# Patient Record
Sex: FEMALE | Race: BLACK OR AFRICAN AMERICAN | NOT HISPANIC OR LATINO | Employment: FULL TIME | URBAN - METROPOLITAN AREA
[De-identification: names, ages, dates, MRNs, and addresses within clinical notes are randomized per-mention and may not be internally consistent; named-entity substitution may affect disease eponyms.]

---

## 2017-10-24 ENCOUNTER — ALLSCRIPTS OFFICE VISIT (OUTPATIENT)
Dept: OTHER | Facility: OTHER | Age: 47
End: 2017-10-24

## 2017-10-24 DIAGNOSIS — R06.02 SHORTNESS OF BREATH: ICD-10-CM

## 2017-10-24 DIAGNOSIS — R00.2 PALPITATIONS: ICD-10-CM

## 2017-10-25 NOTE — PROGRESS NOTES
Assessment  1  Palpitations (785 1) (R00 2)   2  Shortness of breath (786 05) (R06 02)   3  History of Crohn's disease (V12 70) (Z87 19)   4  Never a smoker   5  Occasional alcohol use    Plan   · ECHO COMPLETE WITH CONTRAST IF INDICATED; Status:Need Information - Financial  Authorization,Retrospective By Protocol Authorization; Requested KZD:[id number];    Perform:[facility]; YIV:[id number]; Last Updated By:Les Taylor; 10/24/2017 4:28:12 PM;Ordered; For:Palpitations, Shortness of breath; Ordered By:Gladis Morgan;   · ECHO STRESS TEST W CONTRAST IF INDICATED; Status:Need Information - Financial  Authorization,Retrospective Authorization; Requested YONY:[id number];    Perform:[facility]iology; BAB:[id number]; Last Updated By:Les Taylor; 10/24/2017 4:28:12 PM;Ordered; For:Palpitations, Shortness of breath; Ordered By:Gladis Morgan;   · HOLTER MONITOR - 48 HOUR; Status:Hold For - Scheduling,Retrospective  Authorization; Requested EFP:[id number];    Perform:[facility]; YWK:[id number]; Last Updated By:Les Taylor; 10/24/2017 4:28:12 PM;Ordered; For:Palpitations, Shortness of breath; Ordered By:Gladis Morgan;   · EKG/ECG- POC; Status:Complete;   Done: [id number]   Perform: In Office; 21 ; Ordered; For:Palpitations, Shortness of breath; Ordered By:Gladis Morgan;    Discussion/Summary    EKG done in the clinic reviewed with the patient  hour Holter test ordered for palpitations  echocardiogram ordered for EF evaluation and for valves and RWMA  test ordered to rule out ischemia as a cause of her dyspnea on exertion  aggressive risk factor modification and therapeutic lifestyle changes  Low salt, low calorie, low fat, low cholesterol diet with regular exercise and to optimize weight  I will defer the ordering and monitoring of necessary lab studies to you, but I am available and happy to review and manage any of that data at your request in the future  concepts of atherosclerosis, including signs and symptoms of cardiac disease  studies were reviewed  measures were reviewed  were entertained and answered  was advised to report any problem requiring medical attention  up with appropriate specialists and lab work as discussed  for follow up visit as scheduled or earlier, if needed  recommendations will be forthcoming after the above testing is performed and results available  you for allowing me to participate in the care and evaluation of your patient  Should you have any questions, please feel free to contact me  The patient was counseled regarding diagnostic results,-- instructions for management,-- risk factor reductions,-- patient and family education,-- importance of compliance with treatment  The patient has the current Goals: Symptom free  The patent has the current Barriers: None  Patient is able to Self-Care  Possible side effects of new medications were reviewed with the patient/guardian today  The treatment plan was reviewed with the patient/guardian  The patient/guardian understands and agrees with the treatment plan      Chief Complaint  Palpitations  Shortness of breath on exertion      History of Present Illness  HPI: Patient states that for the last few days she has been having episodes of fluttering in her chest  Occurs at random times but lasts for a short period  No associated symptoms  Not necessarily related to exertion  She is concerned that it is occurring every day  days ago she was walking briskly to catch a plane and got extremely short of breath with pounding heartbeats  She had to stop multiple times in the brisk walk and is concerned about her dyspnea on exertion  Had some chest heaviness but no chest pain  Patient denies lightheadedness, syncope, swelling feet, orthopnea, PND    states she has put on around 5 pound of weight in the last couple of months      Review of Systems      Cardiac: as noted in HPI  Skin: No complaints of nonhealing sores or skin rash  Genitourinary: No complaints of recurrent urinary tract infections, frequent urination at night, difficult urination, blood in urine, kidney stones, loss of bladder control, kidney problems, denies any birth control or hormone replacement, is not post menopausal, not currently pregnant  Psychological: No complaints of feeling depressed, anxiety, panic attacks, or difficulty concentrating  General: No complaints of trouble sleeping, lack of energy, fatigue, appetite changes, weight changes, fever, frequent infections, or night sweats  Respiratory: as noted in HPI  HEENT: No complaints of serious problems, hearing problems, nose problems, throat problems, or snoring  Gastrointestinal: No complaints of liver problems, nausea, vomiting, heartburn, constipation, bloody stools, diarrhea, problems swallowing, adbominal pain, or rectal bleeding  Hematologic: No complaints of bleeding disorders, anemia, blood clots, or excessive brusing  Neurological: No complaints of numbness, tingling, dizziness, weakness, seizures, headaches, syncope or fainting, AM fatigue, daytime sleepiness, no witnessed apnea episodes  Musculoskeletal: No complaints of arthritis, back pain, or painfull swelling  ROS reviewed  Active Problems  1  Palpitations (785 1) (R00 2)   2  Shortness of breath (786 05) (R06 02)    Past Medical History   · History of Crohn's disease (V12 70) (Z87 19)    The active problems and past medical history were reviewed and updated today  Surgical History   · History of Small Bowel Resection    The surgical history was reviewed and updated today  Family History  Family History    · Family history of hypertension (V17 49) (Z82 49)    The family history was reviewed and updated today  Social History   · Never a smoker   · Occasional alcohol use  The social history was reviewed and updated today  The social history was reviewed and is unchanged  Current Meds   1   No Reported Medications Recorded    The medication list was reviewed and updated today  Allergies  1  No Known Drug Allergies    Vitals  Signs   Heart Rate: 81  Systolic: 483  Diastolic: 76  Weight: 094 lb   O2 Saturation: 97    Physical Exam    Constitutional   General appearance: No acute distress, well appearing and well nourished  Eyes   Conjunctiva and Sclera examination: Conjunctiva pink, sclera anicteric  Ears, Nose, Mouth, and Throat - External inspection of ears and nose: Normal without deformities or discharge  -- Oropharynx: Clear, nares are clear, mucous membranes are moist    Neck   Neck and thyroid: Normal, supple, trachea midline, no thyromegaly  Pulmonary   Respiratory effort: No increased work of breathing or signs of respiratory distress  Auscultation of lungs: Clear to auscultation, no rales, no rhonchi, no wheezing, good air movement  Cardiovascular   Palpation of heart: Normal PMI, no thrills  Auscultation of heart: Normal rate and rhythm, normal S1 and S2, no murmurs  Carotid pulses: Normal, 2+ bilaterally  Pedal pulses: Normal, 2+ bilaterally  Examination of extremities for edema and/or varicosities: Normal     Chest - Chest: Normal    Abdomen   Abdomen: Non-tender and no distention  Liver and spleen: No hepatomegaly or splenomegaly  Musculoskeletal Gait and station: Normal gait  -- Digits and nails: Normal without clubbing or cyanosis  -- Inspection/palpation of joints, bones, and muscles: Normal, ROM normal     Skin - Skin and subcutaneous tissue: Normal without rashes or lesions  Skin is warm and well perfused, normal turgor      Neurologic - Speech: Normal     Psychiatric - Orientation to person, place, and time: Normal -- Mood and affect: Normal       Results/Data  10/24/17 - sinus rhythm, nonspecific T-wave changes      Signatures   Electronically signed by : MILAGRO Otto ; Oct 24 2017  5:37PM EST                       (Author)

## 2017-11-13 ENCOUNTER — HOSPITAL ENCOUNTER (OUTPATIENT)
Dept: NON INVASIVE DIAGNOSTICS | Facility: CLINIC | Age: 47
Discharge: HOME | End: 2017-11-13
Payer: COMMERCIAL

## 2017-11-13 DIAGNOSIS — R00.2 PALPITATIONS: ICD-10-CM

## 2017-11-13 DIAGNOSIS — R06.02 SHORTNESS OF BREATH: ICD-10-CM

## 2017-11-13 PROCEDURE — 93350 STRESS TTE ONLY: CPT

## 2017-11-13 PROCEDURE — 93306 TTE W/DOPPLER COMPLETE: CPT

## 2017-11-13 PROCEDURE — 93225 XTRNL ECG REC<48 HRS REC: CPT

## 2017-11-13 PROCEDURE — 93226 XTRNL ECG REC<48 HR SCAN A/R: CPT

## 2017-11-14 ENCOUNTER — GENERIC CONVERSION - ENCOUNTER (OUTPATIENT)
Dept: OTHER | Facility: OTHER | Age: 47
End: 2017-11-14

## 2017-11-14 LAB
ARRHY DURING EX: NORMAL
CHEST PAIN STATEMENT: NORMAL
MAX DIASTOLIC BP: 70 MMHG
MAX HEART RATE: 157 BPM
MAX PREDICTED HEART RATE: 175 BPM
MAX. SYSTOLIC BP: 162 MMHG
PROTOCOL NAME: NORMAL
REASON FOR TERMINATION: NORMAL
TARGET HR FORMULA: NORMAL
TEST INDICATION: NORMAL
TIME IN EXERCISE PHASE: 416 S

## 2017-11-20 ENCOUNTER — GENERIC CONVERSION - ENCOUNTER (OUTPATIENT)
Dept: OTHER | Facility: OTHER | Age: 47
End: 2017-11-20

## 2018-01-10 NOTE — RESULT NOTES
Verified Results  ECHO STRESS TEST W CONTRAST IF INDICATED [id number] 01:55PM Gabrielle Spurling Order Number: [id number]    - Patient Instructions: To schedule this appointment, please contact Central Scheduling at [phone]  Test Name Result Flag Reference   ECHO STRESS TEST W CONTRAST IF INDICATED (Report)     [address]   [phone]     Exercise Stress Echocardiography     Study date: 2017     Patient: Guzman Romo   MR number: [id number]   Account number: [de-identified]   : 1970   Age: 41 years   Gender: Female   Study date: 2017   Status: Outpatient   Location: [facility]   Height: 68 in   Weight: 179 lb   BP: 122// 80 mmHg     Indications: Detection of coronary artery disease  Diagnosis: R06 02 - Shortness of breath     Sonographer: Bullock RCS   Interpreting Physician: Kassie Judd MD   Referring Physician: Kassie Judd MD   Group: [facility]   Other: Evelia Whyte MS, CCT     IMPRESSIONS:   Normal study after maximal exercise  Left ventricular systolic function was normal      SUMMARY     STRESS RESULTS:   Duration of exercise was 7 min  Maximal heart rate during stress was 157 bpm ( 90 % of maximal predicted heart rate)  Target heart rate was achieved  There was no chest pain during stress  ECG CONCLUSIONS:   The stress ECG was normal    There were no stress arrhythmias or conduction abnormalities  BASELINE:   There were no regional wall motion abnormalities  Estimated left ventricular ejection fraction was in the range of 55 % to 65 %  PEAK STRESS:   There were no regional wall motion abnormalities  There was an appropriate augmentation in LV function  HISTORY: The patient is a 41year old  female  Chest pain status: no chest pain  Other symptoms: dyspnea and palpitations   Coronary artery disease risk factors: none  Cardiovascular history: none significant  Medications:   none  REST ECG: Normal baseline ECG  PROCEDURE: The study was performed in the stress lab  The study was performed in the [address] [place] [address]  Treadmill exercise testing was performed, using the Dariana protocol  Stress and rest echocardiographic evaluation was   performed from multiple acoustic windows for evaluation of ventricular function  DARIANA PROTOCOL:   HR bpm SBP mmHg DBP mmHg Symptoms   Baseline 71 122 80 none   Stage 1 121 138 76 --   Stage 2 148 162 70 --   Stage 3 155 -- -- --   Immediate 157 -- -- --   Recovery 1 131 160 64 --   Recovery 2 105 -- -- --   Recovery 3 97 -- -- --   Recovery 5 94 122 76 --     MEDICATIONS GIVEN: No medications or fluids given  STRESS RESULTS: Duration of exercise was 7 min  The patient exercised to protocol stage 3  Functional capacity was normal  Maximal heart rate during stress was 157 bpm ( 90 % of maximal predicted heart rate)  Target heart rate was   achieved  The heart rate response to stress was normal  Maximal systolic blood pressure during stress was 162 mmHg  There was normal resting blood pressure with an appropriate response to stress  The rate-pressure product for the peak   heart rate and blood pressure was 10024  There was no chest pain during stress  The stress test was terminated due to achievement of target heart rate and mild dyspnea  ECG CONCLUSIONS: The stress ECG was normal  There were no stress arrhythmias or conduction abnormalities  STRESS 2D ECHO RESULTS:     BASELINE: There were no regional wall motion abnormalities  Left ventricular size was normal  Overall left ventricular systolic function was normal  Estimated left ventricular ejection fraction was in the range of 55 % to 65 %  PEAK STRESS: There were no regional wall motion abnormalities  There was an appropriate reduction in left ventricular size   There was an appropriate augmentation in LV function  Prepared and electronically signed by     Solitario Haines MD   Signed [id number] 08:20:20     ECHO COMPLETE WITH CONTRAST IF INDICATED [id number] 01:49PM Naomie Sparks Order Number: [id number]    - Patient Instructions: To schedule this appointment, please contact Central Scheduling at [phone]  Test Name Result Flag Reference   ECHO COMPLETE WITH CONTRAST IF INDICATED (Report)     [facility], [address] [place] [address]   [phone]     Transthoracic Echocardiogram   2D, M-mode, Doppler, and Color Doppler     Study date: 2017     Patient: Kacie Justice   MR number: [id number]   Account number: [de-identified]   : 1970   Age: 41 years   Gender: Female   Status: Outpatient   Location: [facility]   Height: 68 in   Weight: 179 lb   BP: 118/ 76 mmHg     Indications: Palpitations  Diagnoses: R00 2 - Palpitations     Sonographer: Seymour Chapin, RIC   Interpreting Physician: Solitario Haines MD   Referring Physician: Solitario Haines MD   Group: [facility]     LEFT VENTRICLE:   Systolic function was normal  Ejection fraction was estimated to be 60 %  There were no regional wall motion abnormalities  RIGHT VENTRICLE:   The size was normal    Systolic function was normal      MITRAL VALVE:   There was trace regurgitation  TRICUSPID VALVE:   There was mild regurgitation  Pulmonary artery systolic pressure was within the normal range  HISTORY: PRIOR HISTORY: Patient has no history of cardiovascular disease  PROCEDURE: The study was performed in the [address] [place] [address]  This was a routine study  The transthoracic approach was used  The study included complete 2D imaging, M-mode, complete spectral Doppler, and color Doppler  The   heart rate was 84 bpm, at the start of the study   Images were obtained from the parasternal, apical, subcostal, and suprasternal notch acoustic windows  Image quality was adequate  LEFT VENTRICLE: Size was normal  Systolic function was normal  Ejection fraction was estimated to be 60 %  There were no regional wall motion abnormalities  Wall thickness was normal  No evidence of apical thrombus  DOPPLER: Left   ventricular diastolic function parameters were normal      RIGHT VENTRICLE: The size was normal  Systolic function was normal  Wall thickness was normal      LEFT ATRIUM: Size was normal      RIGHT ATRIUM: Size was normal      MITRAL VALVE: Valve structure was normal  There was normal leaflet separation  DOPPLER: The transmitral velocity was within the normal range  There was no evidence for stenosis  There was trace regurgitation  AORTIC VALVE: The valve was trileaflet  Leaflets exhibited normal thickness and normal cuspal separation  DOPPLER: Transaortic velocity was within the normal range  There was no evidence for stenosis  There was no significant   regurgitation  TRICUSPID VALVE: The valve structure was normal  There was normal leaflet separation  DOPPLER: The transtricuspid velocity was within the normal range  There was no evidence for stenosis  There was mild regurgitation  Pulmonary artery   systolic pressure was within the normal range  PULMONIC VALVE: Leaflets exhibited normal thickness, no calcification, and normal cuspal separation  DOPPLER: The transpulmonic velocity was within the normal range  There was no significant regurgitation  PERICARDIUM: There was no pericardial effusion  The pericardium was normal in appearance  AORTA: The root exhibited normal size  SYSTEMIC VEINS: IVC: The inferior vena cava was normal in size  Respirophasic changes were normal      SYSTEM MEASUREMENT TABLES     Apical four chamber   4 chamber Left Atrium Volume Index; Planimetry; End Systole;  Apical four chamber;: 14 89 cm2   Left Ventricular Ejection Fraction; Method of Disks, Single Plane; Apical four chamber;: 68 %   Left Ventricular End Diastolic Volume; Method of Disks, Single Plane; Apical four chamber;: 60 9 ml   Left Ventricular End Systolic Volume; Method of Disks, Single Plane; Apical four chamber;: 19 5 ml   Right Atrium Systolic Area; Planimetry; End Systole; Apical four chamber;: 12 06 cm2   Right Ventricular Internal Diastolic Dimension; End Diastole; Apical four chamber;: 40 mm   TAPSE: 24 3 mm     Unspecified Scan Mode   Aortic Root Diameter; End Systole;: 30 7 mm   Gradient Pressure, Peak; Simplified Bernoulli; Antegrade Flow; Systole;: 8 1 mm[Hg]   Gradient pressure, average; Simplified Bernoulli; Antegrade Flow; Systole;: 5 1 mm[Hg]   Left atrial diameter; End Diastole;: 35 3 mm   Cardiac Output; Teichholz; Systole;: 4 1 L/min   Interventricular Septum Diastolic Thickness; Teichholz; End Diastole;: 10 2 mm   Left Ventricle Internal End Diastolic Dimension; Teichholz;: 42 1 mm   Left Ventricle Internal Systolic Dimension; Teichholz; End Systole;: 26 2 mm   Left Ventricle Posterior Wall Diastolic Thickness; Teichholz; End Diastole;: 9 2 mm   Left Ventricular Ejection Fraction; Teichholz;: 68 2 %   Stroke volume;  Teichholz; Systole;: 53 9 ml   Mitral Valve Area; Area by Pressure Half-Time; Systole;: 4 31 cm2   Mitral Valve E to A Ratio; Systole;: 1 56   Maximum Tricuspid valve regurgitation pressure gradient; Regurgitant Flow; Systole;: 28 2 mm[Hg]     [facility]     Prepared and electronically signed by     Jacey Tavarez MD   Signed [id number] 84:01:79

## 2018-01-13 VITALS
DIASTOLIC BLOOD PRESSURE: 76 MMHG | SYSTOLIC BLOOD PRESSURE: 118 MMHG | HEART RATE: 81 BPM | OXYGEN SATURATION: 97 % | WEIGHT: 179 LBS

## 2018-02-13 NOTE — RESULT NOTES
Verified Results  HOLTER MONITOR - 50 HOUR [id number] 01:55PM Perry Burleson Order Number: [id number]    - Patient Instructions: To schedule this appointment, please contact Central Scheduling at [phone]  Test Name Result Flag Reference   HOLTER MONITOR - 48 HOUR (Report)     INDICATIONS:    Palpitations, SOB     1  This Holter monitoring and analysis was done for a period of 47 hours and 59 minutes  2  The rhythm was sinus  Average heart rate was 82 bpm  Minimum heart rate was 56 bpm  Maximum heart rate was 135 bpm    3  No ventricular ectopic activity was seen  4  Supraventricular ectopic activity consisted of 1 PAC  5  No irregular rhythm episodes were detected  6  Patient's rhythm included 13 minutes 30 seconds of bradycardia  The slowest single episode of bradycardia lasted 17 seconds with a minimum heart rate of 56 bpm The longest R-R interval 1 2 seconds  7  Patient's rhythm included 4 hours 42 minutes of tachycardia  The fastest single episode of tachycardia lasted 8 minutes 8 seconds with a maximum heart rate of 135 bpm    8  No complaints were noted in the patient's diary during the study  CONCLUSIONS:   Sinus rhythm with minimal ectopic activity as detailed above     Absence of symptoms during the study

## 2019-12-01 ENCOUNTER — HOSPITAL ENCOUNTER (EMERGENCY)
Facility: HOSPITAL | Age: 49
Discharge: HOME | End: 2019-12-01
Attending: EMERGENCY MEDICINE | Admitting: EMERGENCY MEDICINE
Payer: COMMERCIAL

## 2019-12-01 ENCOUNTER — APPOINTMENT (EMERGENCY)
Dept: RADIOLOGY | Facility: HOSPITAL | Age: 49
End: 2019-12-01
Payer: COMMERCIAL

## 2019-12-01 VITALS
DIASTOLIC BLOOD PRESSURE: 70 MMHG | HEART RATE: 92 BPM | RESPIRATION RATE: 18 BRPM | WEIGHT: 177.91 LBS | SYSTOLIC BLOOD PRESSURE: 137 MMHG | OXYGEN SATURATION: 98 % | TEMPERATURE: 98.5 F
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
## 2019-12-01 DIAGNOSIS — S52.122A LEFT RADIAL HEAD FRACTURE: ICD-10-CM

## 2019-12-01 PROCEDURE — 73080 X-RAY EXAM OF ELBOW: CPT

## 2019-12-01 PROCEDURE — 99283 EMERGENCY DEPT VISIT LOW MDM: CPT

## 2019-12-01 PROCEDURE — 99284 EMERGENCY DEPT VISIT MOD MDM: CPT | Performed by: EMERGENCY MEDICINE

## 2019-12-01 NOTE — ED PROVIDER NOTES
History  Chief Complaint   Patient presents with    Fall     Pt reports having left hip, elbow, and shoulder pain since she slipped off a box and landed on pavement  Pt denies LOC  Tripped and fell yesterday while stepping on a box to try to collapse the box  Landed on L side and L elbow  Did not strike head or lose consciousness  No HA, neck pain, numbness, weakness, cp, sob, abd pain or back pain  Has mild L hip pain, still ambulating w/o difficulty  Mostly c/o L elbow pain which is moderate, worse with movement, associated w inability to extend arm and painful supination  No numbness or weakness or arm  No hand or forearm pain or swelling  Shoulder is mild painful but ranges it without difficulty or limitation  Not anticoagulated  No fever or chills  R hand dominant  Works as a   No other sxs or concerns at this time  None       History reviewed  No pertinent past medical history  History reviewed  No pertinent surgical history  History reviewed  No pertinent family history  I have reviewed and agree with the history as documented  Social History     Tobacco Use    Smoking status: Not on file   Substance Use Topics    Alcohol use: Not on file    Drug use: Not on file        Review of Systems   Musculoskeletal: Positive for joint swelling  All other systems reviewed and are negative  Physical Exam  Physical Exam   Constitutional: She is oriented to person, place, and time  She appears well-developed and well-nourished  No distress  HENT:   Head: Normocephalic and atraumatic  Eyes: Pupils are equal, round, and reactive to light  Conjunctivae and EOM are normal    Neck: Normal range of motion  Neck supple  No JVD present  Cardiovascular: Normal rate, regular rhythm, normal heart sounds and intact distal pulses  Pulmonary/Chest: Effort normal and breath sounds normal  No stridor  Abdominal: Soft  She exhibits no distension  There is no tenderness   There is no rebound and no guarding  Musculoskeletal: Normal range of motion  She exhibits edema and tenderness  She exhibits no deformity  Swelling and tenderness overlying posterior elbow/distal L arm  Painless passive ROM L shoulder  Forearm soft, nontender  Normal distal perfusion and sensation of LUE  L hand and wrist nontender  Painless passive ROM L hip and knee  Neurological: She is alert and oriented to person, place, and time  No cranial nerve deficit or sensory deficit  She exhibits normal muscle tone  Coordination normal    Skin: Skin is warm and dry  Capillary refill takes less than 2 seconds  No rash noted  She is not diaphoretic  No erythema  No pallor  Nursing note and vitals reviewed  Vital Signs  ED Triage Vitals [12/01/19 1028]   Temperature Pulse Respirations Blood Pressure SpO2   98 5 °F (36 9 °C) 92 18 137/70 98 %      Temp Source Heart Rate Source Patient Position - Orthostatic VS BP Location FiO2 (%)   Oral Monitor Sitting Right arm --      Pain Score       --           Vitals:    12/01/19 1028   BP: 137/70   Pulse: 92   Patient Position - Orthostatic VS: Sitting         Visual Acuity  Visual Acuity      Most Recent Value   L Pupil Size (mm)  3   R Pupil Size (mm)  3          ED Medications  Medications - No data to display    Diagnostic Studies  Results Reviewed     None                 XR elbow 3+ vw LEFT   ED Interpretation by Noah Santos MD (12/01 1121)   Lucency through lateral radial head  Abnormal appearance of distal humerus  Procedures  Procedures       ED Course                               MDM    Disposition  Final diagnoses:   Fall, initial encounter   Left radial head fracture     Time reflects when diagnosis was documented in both MDM as applicable and the Disposition within this note     Time User Action Codes Description Comment    12/1/2019 11:30 AM Sweta Galaviz  BBCA] Fall, initial encounter     12/1/2019 11:30 AM Juan A Richard Blocker Add [G86 480N] Left radial head fracture       ED Disposition     ED Disposition Condition Date/Time Comment    Discharge Stable Sun Dec 1, 2019 11:30 AM Kai Herrmann discharge to home/self care  Follow-up Information     Follow up With Specialties Details Why Shavonne Richey MD Orthopedic Surgery In 1 week  [address] [address] [phone]            There are no discharge medications for this patient  No discharge procedures on file      ED Provider  Electronically Signed by           Slava Camp MD  12/01/19 5850

## 2019-12-04 ENCOUNTER — OFFICE VISIT (OUTPATIENT)
Dept: OBGYN CLINIC | Facility: CLINIC | Age: 49
End: 2019-12-04
Payer: COMMERCIAL

## 2019-12-04 VITALS
DIASTOLIC BLOOD PRESSURE: 74 MMHG | BODY MASS INDEX: 28.59 KG/M2 | HEART RATE: 94 BPM | SYSTOLIC BLOOD PRESSURE: 107 MMHG | HEIGHT: 66 IN | WEIGHT: 177.9 LBS

## 2019-12-04 DIAGNOSIS — M25.522 PAIN IN LEFT ELBOW: Primary | ICD-10-CM

## 2019-12-04 PROCEDURE — 99203 OFFICE O/P NEW LOW 30 MIN: CPT | Performed by: ORTHOPAEDIC SURGERY

## 2019-12-04 NOTE — PROGRESS NOTES
CHIEF COMPLAINT:  Chief Complaint   Patient presents with    Left Elbow - Pain       SUBJECTIVE:  Veena Goodman is a 54y o  year old RHD female who presents left elbow pain  Patient states that she had a fall on 11/30/2019 onto her left hip, elbow and shoulder  She went to the emergency room on 12/01/2019 for evaluation  She had x-rays which demonstrated some fluid in the anterior posterior aspect of the elbow with no discernible fracture  She was given a sling and instructed follow up with Orthopedics  Today she states she has some discomfort with coming out into full extension  She has been moving her elbow back and forth to help prevent stiffness  She denies any numbness or tingling  PAST MEDICAL HISTORY:  History reviewed  No pertinent past medical history  PAST SURGICAL HISTORY:  History reviewed  No pertinent surgical history  FAMILY HISTORY:  History reviewed  No pertinent family history  SOCIAL HISTORY:  Social History     Tobacco Use    Smoking status: Not on file   Substance Use Topics    Alcohol use: Not on file    Drug use: Not on file       MEDICATIONS:  No current outpatient medications on file      ALLERGIES:  No Known Allergies    REVIEW OF SYSTEMS:  Review of Systems  ROS:   General: no fever, no chills  HEENT:  No loss of hearing or eyesight problems  Eyes:  No red eyes  Respiratory:  No coughing, shortness of breath or wheezing  Cardiovascular:  No chest pain, no palpitations  GI:  Abdomen soft nontender, denies nausea  Endocrine:  No muscle weakness, no frequent urination, no excessive thirst  Urinary:  No dysuria, no incontinence  Musculoskeletal: see HPI and PE  SKIN:  No skin rash, no dry skin  Neurological:  No headaches, no confusion  Psychiatric:  No suicide thoughts, no anxiety, no depression  Review of all other systems is negative    VITALS:  Vitals:    12/04/19 1000   BP: 107/74   Pulse: 94       LABS:  HgA1c: No results found for: HGBA1C  BMP: No results found for: GLUCOSE, CALCIUM, NA, K, CO2, CL, BUN, CREATININE    _____________________________________________________  PHYSICAL EXAMINATION:  General: well developed and well nourished, alert, oriented times 3 and appears comfortable  Psychiatric: Normal  HEENT: Trachea Midline, No torticollis  Pulmonary: No audible wheezing or strider  Cardiovascular: No discernable arrhythmia   Skin: No masses, erythema, lacerations, fluctation, ulcerations  Neurovascular: Sensation Intact to the Median, Ulnar, Radial Nerve, Motor Intact to the Median, Ulnar, Radial Nerve and Pulses Intact    MUSCULOSKELETAL EXAMINATION:  Left elbow  No erythema edema or ecchymosis noted, skin is warm to touch  Tenderness to palpation over the UCL  Elbow is stable to stress testing  Range of motion is limited only at terminal ends of flexion and extension  Patient is neurovascularly intact    ___________________________________________________  STUDIES REVIEWED:  Images obtained on 12/01/2019 of the Left elbow 3 views demonstrate No acute fractures or dislocations appreciated, there is anterior and posterior fat pad signs appreciated      PROCEDURES PERFORMED:  Procedures  No Procedures performed today    _____________________________________________________  ASSESSMENT/PLAN:    Left elbow pain with associated fat pad sign on x-ray with UCL strain  - patient has good range of motion upon exam today  - she was instructed that she can work on her range of motion and use the sling as needed for comfort  - she can take Tylenol and anti-inflammatories as needed  - she will follow-up in 3 weeks for clinical re-evaluation    Follow Up:  Return in about 3 weeks (around 12/25/2019)      Work/school status:  No restrictions    To Do Next Visit:  Re-evaluation of current issue        Scribe Attestation    I,:   Greyson Becerril PA-C am acting as a scribe while in the presence of the attending physician :        I,:   Sharan Arevalo MD personally performed the services described in this documentation    as scribed in my presence :

## 2020-03-25 ENCOUNTER — NURSE TRIAGE (OUTPATIENT)
Dept: OTHER | Facility: OTHER | Age: 50
End: 2020-03-25

## 2020-03-25 NOTE — TELEPHONE ENCOUNTER
Regarding: Coronavirus concerns  ----- Message from Miguel Velásquez sent at 3/25/2020  2:17 PM EDT -----  " Shortness of breath, cough, sinus congestion ( flight attendantno pcp)  "

## 2020-03-25 NOTE — TELEPHONE ENCOUNTER
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

## 2020-03-25 NOTE — TELEPHONE ENCOUNTER
Reason for Disposition   [1] Fever (or feeling feverish) OR cough occurs AND [2] travel from or living in high risk area (identified by [facility]) AND [3] within last 14 days    Answer Assessment - Initial Assessment Questions  1  CONFIRMED CASE: "Who is the person with the confirmed COVID-19 infection that you were exposed to?"      n/a  2  PLACE of CONTACT: "Where were you when you were exposed to COVID-19  (coronavirus disease 2019)?" (e g , city, state, country)      [place] and [place]  3  TYPE of CONTACT: "How much contact was there?" (e g , live in same house, work in same office, same school)      Direct contact with a couhig child on a flight  4  DATE of CONTACT: "When did you have contact with a coronavirus patient?" (e g , days)     3/18/20  5  DURATION of CONTACT: "How long were you in contact with the COVID-19 (coronavirus disease) patient?" (e g , a few seconds, passed by person, a few minutes, live with the patient)      Few seconds  6  SYMPTOMS: "Do you have any symptoms?" (e g , fever, cough, breathing difficulty)      Cough,mild SOB,wheeze,chest pressure  7  PREGNANCY OR POSTPARTUM: "Is there any chance you are pregnant?" "When was your last menstrual period?" "Did you deliver in the last 2 weeks?"      n/a  8  HIGH RISK: "Do you have any heart or lung problems?  Do you have a weakened immune system?" (e g , CHF, COPD, asthma, HIV positive, chemotherapy, renal failure, diabetes mellitus, sickle cell anemia)      n/a    Protocols used: CORONAVIRUS (COVID-19) EXPOSURE-ADULT-[place]

## 2020-03-26 ENCOUNTER — NURSE TRIAGE (OUTPATIENT)
Dept: OTHER | Facility: OTHER | Age: 50
End: 2020-03-26

## 2020-03-26 DIAGNOSIS — Z20.828 EXPOSURE TO SARS-ASSOCIATED CORONAVIRUS: ICD-10-CM

## 2020-03-26 DIAGNOSIS — Z20.828 EXPOSURE TO SARS-ASSOCIATED CORONAVIRUS: Primary | ICD-10-CM

## 2020-03-26 PROCEDURE — U0002 COVID-19 LAB TEST NON-CDC: HCPCS

## 2020-03-26 PROCEDURE — 87635 SARS-COV-2 COVID-19 AMP PRB: CPT

## 2020-03-26 NOTE — TELEPHONE ENCOUNTER
Called yesterday 3/25/20 and stated she spoke with triage nurse or ordered a Vernadine Shells test at [facility] testing site but was unable to go before it closed  No call or order seen in chart  Patient is a  out of [address] [place] [address]  Thinks she was exposed due to sick passengers on the plane  C/o cough, mild SOB, no fever  Sent to [facility] testing site in 1 hour  Reason for Disposition   [1] Fever (or feeling feverish) OR cough occurs AND [2] travel from or living in high risk area (identified by [facility]) AND [3] within last 14 days    Answer Assessment - Initial Assessment Questions  1  CONFIRMED CASE: "Who is the person with the confirmed COVID-19 infection that you were exposed to?"      N/A  2  PLACE of CONTACT: "Where were you when you were exposed to COVID-19  (coronavirus disease 2019)?" (e g , city, state, country)       out of [address] [place] [address]  3  TYPE of CONTACT: "How much contact was there?" (e g , live in same house, work in same office, same school)      N/A  4  DATE of CONTACT: "When did you have contact with a coronavirus patient?" (e g , days)      3/19/2020 on flight with sick passengers  5  DURATION of CONTACT: "How long were you in contact with the COVID-19 (coronavirus disease) patient?" (e g , a few seconds, passed by person, a few minutes, live with the patient)      Minimal  6  SYMPTOMS: "Do you have any symptoms?" (e g , fever, cough, breathing difficulty      Cough, no fever, mild SOB  7  PREGNANCY OR POSTPARTUM: "Is there any chance you are pregnant?" "When was your last menstrual period?" "Did you deliver in the last 2 weeks?"      No  8  HIGH RISK: "Do you have any heart or lung problems?  Do you have a weakened immune system?" (e g , CHF, COPD, asthma, HIV positive, chemotherapy, renal failure, diabetes mellitus, sickle cell anemia)      Asthma as child, heart murmur    Protocols used: CORONAVIRUS (COVID-19) EXPOSURE-ADULT-[place]

## 2020-03-26 NOTE — TELEPHONE ENCOUNTER
Regarding: COVID  ----- Message from Frandy Bhatti MA sent at 3/26/2020  7:13 AM EDT -----  Patient called stating she called yesterday and spoke with triage nurse and was directed to go to testing center by 5 PM  Patient states she was unable to make it to center before closing as it was 4 PM and was directed to call back today  Symptoms- cough, SOB, sinus congestion  Traveled due to work as        No PCP

## 2020-03-30 ENCOUNTER — NURSE TRIAGE (OUTPATIENT)
Dept: OTHER | Facility: OTHER | Age: 50
End: 2020-03-30

## 2020-03-30 NOTE — TELEPHONE ENCOUNTER
Regarding: [facility] Test Note for Work  ----- Message from [employer] sent at 3/30/2020 11:03 AM EDT -----  " needs a note for work, to prove that she has taken the covid-19 test has went through call hub and was tested on 3/26 "

## 2020-03-31 LAB — SARS-COV-2 RNA SPEC QL NAA+PROBE: NOT DETECTED

## 2020-04-01 ENCOUNTER — HOSPITAL ENCOUNTER (EMERGENCY)
Facility: HOSPITAL | Age: 50
Discharge: HOME | End: 2020-04-01
Attending: EMERGENCY MEDICINE | Admitting: EMERGENCY MEDICINE
Payer: COMMERCIAL

## 2020-04-01 ENCOUNTER — APPOINTMENT (EMERGENCY)
Dept: RADIOLOGY | Facility: HOSPITAL | Age: 50
End: 2020-04-01
Payer: COMMERCIAL

## 2020-04-01 VITALS
HEART RATE: 78 BPM | RESPIRATION RATE: 18 BRPM | HEIGHT: 66 IN | OXYGEN SATURATION: 100 % | SYSTOLIC BLOOD PRESSURE: 131 MMHG | WEIGHT: 190.92 LBS | TEMPERATURE: 98.2 F | BODY MASS INDEX: 30.68 KG/M2 | DIASTOLIC BLOOD PRESSURE: 74 MMHG

## 2020-04-01 DIAGNOSIS — R05.9 COUGH: ICD-10-CM

## 2020-04-01 DIAGNOSIS — B34.9 VIRAL SYNDROME: Primary | ICD-10-CM

## 2020-04-01 PROCEDURE — 99283 EMERGENCY DEPT VISIT LOW MDM: CPT | Performed by: PHYSICIAN ASSISTANT

## 2020-04-01 PROCEDURE — 71045 X-RAY EXAM CHEST 1 VIEW: CPT

## 2020-04-01 PROCEDURE — 99283 EMERGENCY DEPT VISIT LOW MDM: CPT

## 2020-04-01 RX ORDER — AZITHROMYCIN 250 MG/1
TABLET, FILM COATED ORAL
Qty: 6 TABLET | Refills: 0 | Status: SHIPPED | OUTPATIENT
Start: 2020-04-01 | End: 2020-04-05

## 2020-04-01 RX ORDER — ALBUTEROL SULFATE 2.5 MG/3ML
2.5 SOLUTION RESPIRATORY (INHALATION) EVERY 6 HOURS PRN
Qty: 75 ML | Refills: 0 | Status: SHIPPED | OUTPATIENT
Start: 2020-04-01

## 2020-04-01 RX ORDER — ALBUTEROL SULFATE 90 UG/1
2 AEROSOL, METERED RESPIRATORY (INHALATION) EVERY 4 HOURS PRN
Qty: 1 INHALER | Refills: 0 | Status: SHIPPED | OUTPATIENT
Start: 2020-04-01

## 2020-04-01 RX ORDER — FLUTICASONE PROPIONATE 50 MCG
1 SPRAY, SUSPENSION (ML) NASAL DAILY
Qty: 16 G | Refills: 0 | Status: SHIPPED | OUTPATIENT
Start: 2020-04-01

## 2020-04-27 DIAGNOSIS — B34.9 VIRAL SYNDROME: ICD-10-CM

## 2020-04-28 RX ORDER — FLUTICASONE PROPIONATE 50 MCG
SPRAY, SUSPENSION (ML) NASAL
Qty: 16 ML | Refills: 0 | OUTPATIENT
Start: 2020-04-28

## 2020-06-01 ENCOUNTER — OFFICE VISIT (OUTPATIENT)
Dept: URGENT CARE | Facility: CLINIC | Age: 50
End: 2020-06-01
Payer: COMMERCIAL

## 2020-06-01 VITALS
RESPIRATION RATE: 16 BRPM | HEIGHT: 66 IN | BODY MASS INDEX: 29.09 KG/M2 | DIASTOLIC BLOOD PRESSURE: 86 MMHG | HEART RATE: 78 BPM | WEIGHT: 181 LBS | SYSTOLIC BLOOD PRESSURE: 134 MMHG | TEMPERATURE: 97.9 F | OXYGEN SATURATION: 99 %

## 2020-06-01 DIAGNOSIS — K08.89 PAIN, DENTAL: Primary | ICD-10-CM

## 2020-06-01 PROCEDURE — 99203 OFFICE O/P NEW LOW 30 MIN: CPT | Performed by: PHYSICIAN ASSISTANT

## 2020-06-01 RX ORDER — CLINDAMYCIN HYDROCHLORIDE 300 MG/1
300 CAPSULE ORAL 4 TIMES DAILY
Qty: 28 CAPSULE | Refills: 0 | Status: SHIPPED | OUTPATIENT
Start: 2020-06-01 | End: 2020-06-08

## 2022-04-07 ENCOUNTER — HOSPITAL ENCOUNTER (EMERGENCY)
Facility: HOSPITAL | Age: 52
Discharge: HOME | End: 2022-04-07
Attending: EMERGENCY MEDICINE | Admitting: EMERGENCY MEDICINE
Payer: COMMERCIAL

## 2022-04-07 VITALS
RESPIRATION RATE: 18 BRPM | WEIGHT: 184.2 LBS | DIASTOLIC BLOOD PRESSURE: 65 MMHG | SYSTOLIC BLOOD PRESSURE: 150 MMHG | BODY MASS INDEX: 29.73 KG/M2 | TEMPERATURE: 98.1 F | HEART RATE: 100 BPM | OXYGEN SATURATION: 100 %

## 2022-04-07 DIAGNOSIS — H66.90 OTITIS MEDIA: Primary | ICD-10-CM

## 2022-04-07 PROCEDURE — 99284 EMERGENCY DEPT VISIT MOD MDM: CPT | Performed by: PHYSICIAN ASSISTANT

## 2022-04-07 PROCEDURE — 99284 EMERGENCY DEPT VISIT MOD MDM: CPT

## 2022-04-07 PROCEDURE — 93005 ELECTROCARDIOGRAM TRACING: CPT

## 2022-04-07 RX ORDER — AMOXICILLIN AND CLAVULANATE POTASSIUM 875; 125 MG/1; MG/1
1 TABLET, FILM COATED ORAL 2 TIMES DAILY
Qty: 14 TABLET | Refills: 0 | Status: SHIPPED | OUTPATIENT
Start: 2022-04-07 | End: 2022-04-14

## 2022-04-07 RX ORDER — CIPROFLOXACIN/HYDROCORTISONE 0.2 %-1 %
3 SUSPENSION, DROPS(FINAL DOSAGE FORM)(ML) OTIC (EAR) 2 TIMES DAILY
Qty: 10 ML | Refills: 0 | OUTPATIENT
Start: 2022-04-07 | End: 2022-04-08

## 2022-04-07 RX ORDER — AMOXICILLIN AND CLAVULANATE POTASSIUM 875; 125 MG/1; MG/1
1 TABLET, FILM COATED ORAL ONCE
Status: COMPLETED | OUTPATIENT
Start: 2022-04-07 | End: 2022-04-07

## 2022-04-07 RX ORDER — MECLIZINE HYDROCHLORIDE 25 MG/1
25 TABLET ORAL 3 TIMES DAILY PRN
Qty: 25 TABLET | Refills: 0 | Status: SHIPPED | OUTPATIENT
Start: 2022-04-07

## 2022-04-07 RX ORDER — MECLIZINE HYDROCHLORIDE 25 MG/1
25 TABLET ORAL ONCE
Status: COMPLETED | OUTPATIENT
Start: 2022-04-07 | End: 2022-04-07

## 2022-04-07 RX ADMIN — MECLIZINE HYDROCHLORIDE 25 MG: 25 TABLET ORAL at 19:54

## 2022-04-07 RX ADMIN — AMOXICILLIN AND CLAVULANATE POTASSIUM 1 TABLET: 875; 125 TABLET, FILM COATED ORAL at 19:54

## 2022-04-07 NOTE — ED PROVIDER NOTES
History  Chief Complaint   Patient presents with    Dizziness     pt c/o feeling dizzy and room spinning while taking off on flight this morning  Pt again felt dizzy again on return flight and was vomiting and cold sweats and earache  Pt is a  and this "felt different"  denies cp/sob     55 yo with dizziness and ear ache  Symptoms began this morning  States she woke up and felt sinus pressure and bilateral ear ache  Left worse than right  Around 0830 she had a 10-15 minute episode of dizziness described as room spinning  She closed her eyes and held still and the dizziness improved  She has a "blocked sensation" in her left ear all day today  No fever  No vomiting  No pain or swelling in mastoid area  Happened once more around 1230  No h/o similar  She has h/o crohn's and is otherwise healthy  She does use Q tips on a daily basis as well  She has not had the dizziness since 1230 but has had continued ear ache  History provided by:  Patient   used: No    Dizziness  Quality:  Room spinning  Severity:  Severe  Onset quality:  Sudden  Duration: 15 minutes  Timing:  Sporadic  Progression:  Partially resolved  Chronicity:  New  Relieved by:  Nothing  Worsened by:  Closing eyes and being still  Associated symptoms: nausea    Associated symptoms: no chest pain, no palpitations, no shortness of breath and no vomiting        Prior to Admission Medications   Prescriptions Last Dose Informant Patient Reported? Taking?    albuterol (2 5 mg/3 mL) 0 083 % nebulizer solution   No No   Sig: Take 1 vial (2 5 mg total) by nebulization every 6 (six) hours as needed for wheezing or shortness of breath   albuterol (PROVENTIL HFA,VENTOLIN HFA) 90 mcg/act inhaler   No No   Sig: Inhale 2 puffs every 4 (four) hours as needed for wheezing   fluticasone (FLONASE) 50 mcg/act nasal spray   No No   Si spray into each nostril daily      Facility-Administered Medications: None       Past Medical History:   Diagnosis Date    Crohn disease ([facility] [address] )        Past Surgical History:   Procedure Laterality Date    LAPAROSCOPIC COLON RESECTION         History reviewed  No pertinent family history  I have reviewed and agree with the history as documented  E-Cigarette/Vaping    E-Cigarette Use Never User      E-Cigarette/Vaping Substances     Social History     Tobacco Use    Smoking status: Never Smoker    Smokeless tobacco: Never Used   Vaping Use    Vaping Use: Never used   Substance Use Topics    Alcohol use: Yes    Drug use: Never       Review of Systems   Constitutional: Negative for chills and fever  HENT: Negative for ear pain and sore throat  Eyes: Negative for pain and visual disturbance  Respiratory: Negative for cough and shortness of breath  Cardiovascular: Negative for chest pain and palpitations  Gastrointestinal: Positive for nausea  Negative for abdominal pain and vomiting  Genitourinary: Negative for dysuria and hematuria  Musculoskeletal: Negative for arthralgias and back pain  Skin: Negative for color change and rash  Neurological: Positive for dizziness  Negative for seizures and syncope  All other systems reviewed and are negative  Physical Exam  Physical Exam  Vitals and nursing note reviewed  Constitutional:       General: She is not in acute distress  Appearance: She is well-developed  HENT:      Head: Normocephalic and atraumatic  Right Ear: Hearing, ear canal and external ear normal  Tympanic membrane is injected  Tympanic membrane is not scarred, perforated, erythematous or bulging  Left Ear: Tympanic membrane is injected, erythematous and bulging  Tympanic membrane is not scarred or perforated  Ears:      Comments: Injection of right TM  The left TM is bulging and erythematous  Additionally there appears to be a hematoma along the external auditory canal at the 7 o'clock position  It is not hemotympanum   No mastoid pain/tenderness  Eyes:      Conjunctiva/sclera: Conjunctivae normal    Cardiovascular:      Rate and Rhythm: Normal rate and regular rhythm  Heart sounds: No murmur heard  Pulmonary:      Effort: Pulmonary effort is normal  No respiratory distress  Breath sounds: Normal breath sounds  Abdominal:      Palpations: Abdomen is soft  Tenderness: There is no abdominal tenderness  Musculoskeletal:      Cervical back: Neck supple  Skin:     General: Skin is warm and dry  Neurological:      Mental Status: She is alert  GCS: GCS eye subscore is 4  GCS verbal subscore is 5  GCS motor subscore is 6  Comments: GCS 15  AAOx3  No focal neuro deficits  CN II-XII grossly intact  Speech normal, no aphasia or dysarthria  No pronator drift  Cerebellar function normal  Finger to nose normal  PERRL  EOMI  Peripheral vision intact  No nystagmus  Upper and lower extremity strength 5/5 through   strength 5/5 b/l  Gross sensation intact b/l              Vital Signs  ED Triage Vitals [04/07/22 1858]   Temperature Pulse Respirations Blood Pressure SpO2   98 1 °F (36 7 °C) (!) 111 18 (!) 175/95 97 %      Temp Source Heart Rate Source Patient Position - Orthostatic VS BP Location FiO2 (%)   Oral Monitor Sitting Left arm --      Pain Score       5           Vitals:    04/07/22 1858 04/07/22 1955   BP: (!) 175/95 150/65   Pulse: (!) 111 100   Patient Position - Orthostatic VS: Sitting Lying         Visual Acuity      ED Medications  Medications   meclizine (ANTIVERT) tablet 25 mg (25 mg Oral Given 4/7/22 1954)   amoxicillin-clavulanate (AUGMENTIN) 875-125 mg per tablet 1 tablet (1 tablet Oral Given 4/7/22 1954)       Diagnostic Studies  Results Reviewed     None                 No orders to display              Procedures  Procedures         ED Course                               SBIRT 20yo+      Most Recent Value   SBIRT (22 yo +)    In order to provide better care to our patients, we are screening all of our patients for alcohol and drug use  Would it be okay to ask you these screening questions? Yes Filed at: 04/07/2022 1948   Initial Alcohol Screen: US AUDIT-C     1  How often do you have a drink containing alcohol? 0 Filed at: 04/07/2022 1948   2  How many drinks containing alcohol do you have on a typical day you are drinking? 0 Filed at: 04/07/2022 1948   3b  FEMALE Any Age, or MALE 65+: How often do you have 4 or more drinks on one occassion? 0 Filed at: 04/07/2022 1948   Audit-C Score 0 Filed at: 04/07/2022 1948   ALEX: How many times in the past year have you    Used an illegal drug or used a prescription medication for non-medical reasons? Never Filed at: 04/07/2022 1948                    MDM  Number of Diagnoses or Management Options  Otitis media: new and requires workup  Diagnosis management comments: DDx including but not limited to: Labyrinthitis, vestibular neuronitis,benign paroxysmal positional vertigo, Meniere's disease, metabolic abnormality, otitis media, tumor, intracranial process, cardiac etiology; doubt vertebral or carotid artery dissection  Risk of Complications, Morbidity, and/or Mortality  Presenting problems: moderate  Management options: low  General comments: Plan/MDM: 53 yo with two episodes of dizziness and earache/pressure  Now resolved  She has clear evidence of otitis media and a small hematoma in ear canal (likely from QTip use) that would explain her episodes of dizziness/vertigo  Will cover with both PO abx and drops for otitis media and possible otitis externa  Meclizine for dizziness  We discussed the other potential causes of vertigo including central causes like stroke  We discussed obtaining CTA and labs however given clear source for alternative etiology I did offer to defer workup for now and return if symptoms worsen  She would prefer to defer workup at this time  F/u ENT  Return parameters provided  Pt understands and agrees with plan        Patient Progress  Patient progress: stable      Disposition  Final diagnoses:   Otitis media     Time reflects when diagnosis was documented in both MDM as applicable and the Disposition within this note     Time User Action Codes Description Comment    4/7/2022  7:40 PM Jazmine Cantor Add [H66 [id number]] Otitis media       ED Disposition     ED Disposition Condition Date/Time Comment    Discharge Stable Thu Apr 7, 2022  7:40 PM Magdalene Faster discharge to home/self care  Follow-up Information     Follow up With Specialties Details Why Contact Info Additional Information    [facility], [address] Otolaryngology Call in 1 day  [address] [facility], [address] [facility] 134[address] [facility], [address], [facility] [address]          Discharge Medication List as of 4/7/2022  7:41 PM      START taking these medications    Details   amoxicillin-clavulanate (Augmentin) 875-125 mg per tablet Take 1 tablet by mouth 2 (two) times a day for 7 days, Starting Thu 4/7/2022, Until Thu 4/14/2022, Print      ciprofloxacin-hydrocortisone (Cipro HC) otic suspension Administer 3 drops into the left ear 2 (two) times a day for 7 days, Starting Thu 4/7/2022, Until Thu 4/14/2022, Print      meclizine (ANTIVERT) 25 mg tablet Take 1 tablet (25 mg total) by mouth 3 (three) times a day as needed for dizziness, Starting Thu 4/7/2022, Print         CONTINUE these medications which have NOT CHANGED    Details   albuterol (2 5 mg/3 mL) 0 083 % nebulizer solution Take 1 vial (2 5 mg total) by nebulization every 6 (six) hours as needed for wheezing or shortness of breath, Starting Wed 4/1/2020, Normal      albuterol (PROVENTIL HFA,VENTOLIN HFA) 90 mcg/act inhaler Inhale 2 puffs every 4 (four) hours as needed for wheezing, Starting Wed 4/1/2020, Normal      fluticasone (FLONASE) 50 mcg/act nasal spray 1 spray into each nostril daily, Starting Wed 4/1/2020, Normal             No discharge procedures on file      PDMP Review     None          ED Provider  Electronically Signed by           Darin Rizzo PA-C  04/07/22 2027

## 2022-04-08 LAB
ATRIAL RATE: 99 BPM
P AXIS: 57 DEGREES
PR INTERVAL: 136 MS
QRS AXIS: 50 DEGREES
QRSD INTERVAL: 78 MS
QT INTERVAL: 336 MS
QTC INTERVAL: 431 MS
T WAVE AXIS: -21 DEGREES
VENTRICULAR RATE: 99 BPM

## 2022-04-08 PROCEDURE — 93010 ELECTROCARDIOGRAM REPORT: CPT | Performed by: INTERNAL MEDICINE

## 2022-04-08 RX ORDER — CIPROFLOXACIN AND DEXAMETHASONE 3; 1 MG/ML; MG/ML
4 SUSPENSION/ DROPS AURICULAR (OTIC) 2 TIMES DAILY
Qty: 7.5 ML | Refills: 0 | Status: SHIPPED | OUTPATIENT
Start: 2022-04-08 | End: 2022-06-06

## 2022-04-11 ENCOUNTER — APPOINTMENT (OUTPATIENT)
Dept: LAB | Facility: HOSPITAL | Age: 52
End: 2022-04-11
Payer: COMMERCIAL

## 2022-04-11 DIAGNOSIS — K90.49 FOOD INTOLERANCE: ICD-10-CM

## 2022-04-11 DIAGNOSIS — H69.83 DYSFUNCTION OF BOTH EUSTACHIAN TUBES: ICD-10-CM

## 2022-04-11 DIAGNOSIS — J30.9 ALLERGIC RHINITIS, UNSPECIFIED SEASONALITY, UNSPECIFIED TRIGGER: ICD-10-CM

## 2022-04-11 DIAGNOSIS — J32.9 CHRONIC RHINOSINUSITIS: ICD-10-CM

## 2022-04-11 DIAGNOSIS — K50.10 CROHN'S DISEASE OF COLON WITHOUT COMPLICATION (HCC): ICD-10-CM

## 2022-04-11 DIAGNOSIS — E07.89 THYROID FULLNESS: ICD-10-CM

## 2022-04-11 DIAGNOSIS — Z83.49 FH: THYROID DISEASE: ICD-10-CM

## 2022-04-11 DIAGNOSIS — T70.29XA BAROTRAUMA OF DESCENT, INITIAL ENCOUNTER: ICD-10-CM

## 2022-04-11 DIAGNOSIS — Z13.29 THYROID DISORDER SCREENING: ICD-10-CM

## 2022-04-11 DIAGNOSIS — J31.0 CHRONIC RHINOSINUSITIS: ICD-10-CM

## 2022-04-11 LAB
IGA SERPL-MCNC: 326 MG/DL (ref 70–400)
IGG SERPL-MCNC: 1660 MG/DL (ref 700–1600)
IGM SERPL-MCNC: 128 MG/DL (ref 40–230)
TSH SERPL DL<=0.05 MIU/L-ACNC: 1.58 UIU/ML (ref 0.45–4.5)

## 2022-04-11 PROCEDURE — 83516 IMMUNOASSAY NONANTIBODY: CPT

## 2022-04-11 PROCEDURE — 84443 ASSAY THYROID STIM HORMONE: CPT

## 2022-04-11 PROCEDURE — 82785 ASSAY OF IGE: CPT

## 2022-04-11 PROCEDURE — 36415 COLL VENOUS BLD VENIPUNCTURE: CPT

## 2022-04-11 PROCEDURE — 86003 ALLG SPEC IGE CRUDE XTRC EA: CPT

## 2022-04-11 PROCEDURE — 82784 ASSAY IGA/IGD/IGG/IGM EACH: CPT

## 2022-04-13 LAB
A ALTERNATA IGE QN: <0.1 KUA/I
A FUMIGATUS IGE QN: <0.1 KUA/I
BERMUDA GRASS IGE QN: <0.1 KUA/I
BOXELDER IGE QN: <0.1 KUA/I
C HERBARUM IGE QN: <0.1 KUA/I
CAT DANDER IGE QN: <0.1 KUA/I
CMN PIGWEED IGE QN: <0.1 KUA/I
COMMON RAGWEED IGE QN: <0.1 KUA/I
COTTONWOOD IGE QN: <0.1 KUA/I
D FARINAE IGE QN: 15.7 KUA/I
D PTERONYSS IGE QN: 2.01 KUA/I
DOG DANDER IGE QN: <0.1 KUA/I
LONDON PLANE IGE QN: <0.1 KUA/I
MOUSE URINE PROT IGE QN: <0.1 KUA/I
MT JUNIPER IGE QN: <0.1 KUA/I
MUGWORT IGE QN: <0.1 KUA/I
P NOTATUM IGE QN: <0.1 KUA/I
ROACH IGE QN: <0.1 KUA/I
SHEEP SORREL IGE QN: <0.1 KUA/I
SILVER BIRCH IGE QN: <0.1 KUA/I
TIMOTHY IGE QN: <0.1 KUA/I
TOTAL IGE SMQN RAST: 104 KU/L (ref 0–113)
WALNUT IGE QN: <0.1 KUA/I
WHITE ASH IGE QN: <0.1 KUA/I
WHITE ELM IGE QN: <0.1 KUA/I
WHITE MULBERRY IGE QN: <0.1 KUA/I
WHITE OAK IGE QN: <0.1 KUA/I

## 2022-04-14 ENCOUNTER — HOSPITAL ENCOUNTER (OUTPATIENT)
Dept: ULTRASOUND IMAGING | Facility: HOSPITAL | Age: 52
Discharge: HOME | End: 2022-04-14
Attending: OTOLARYNGOLOGY
Payer: COMMERCIAL

## 2022-04-14 DIAGNOSIS — E07.89 THYROID FULLNESS: ICD-10-CM

## 2022-04-14 LAB
GLIADIN IGG SER IA-ACNC: 93 UNITS (ref 0–19)
GLIADIN PEPTIDE+TTG IGA+IGG SER QL IA: NEGATIVE
NOTE: NORMAL

## 2022-04-14 PROCEDURE — 76536 US EXAM OF HEAD AND NECK: CPT

## 2022-04-28 PROBLEM — J30.9 ALLERGIC RHINITIS: Status: ACTIVE | Noted: 2022-04-28

## 2022-04-28 PROBLEM — J34.2 DEVIATED NASAL SEPTUM: Status: ACTIVE | Noted: 2022-04-28

## 2022-04-28 PROBLEM — R42 VERTIGO: Status: ACTIVE | Noted: 2022-04-28

## 2022-04-28 PROBLEM — H69.93 DYSFUNCTION OF BOTH EUSTACHIAN TUBES: Status: ACTIVE | Noted: 2022-04-28

## 2022-04-28 PROBLEM — J31.0 CHRONIC RHINOSINUSITIS: Status: ACTIVE | Noted: 2022-04-28

## 2022-04-28 PROBLEM — J32.9 CHRONIC RHINOSINUSITIS: Status: ACTIVE | Noted: 2022-04-28

## 2022-04-28 PROBLEM — H69.83 DYSFUNCTION OF BOTH EUSTACHIAN TUBES: Status: ACTIVE | Noted: 2022-04-28

## 2022-04-28 PROBLEM — T70.29XA BAROTRAUMA OF DESCENT: Status: ACTIVE | Noted: 2022-04-28

## 2022-04-28 PROBLEM — K90.49 FOOD INTOLERANCE: Status: ACTIVE | Noted: 2022-04-28

## 2022-04-28 PROBLEM — E07.89 THYROID FULLNESS: Status: ACTIVE | Noted: 2022-04-28

## 2022-04-28 PROBLEM — H92.02 LEFT EAR PAIN: Status: ACTIVE | Noted: 2022-04-28

## 2022-04-28 PROBLEM — K50.10 CROHN'S DISEASE OF COLON WITHOUT COMPLICATION (HCC): Status: ACTIVE | Noted: 2022-04-28

## 2022-04-28 PROBLEM — Z83.49 FH: THYROID DISEASE: Status: ACTIVE | Noted: 2022-04-28

## 2022-04-28 PROBLEM — Z13.29 THYROID DISORDER SCREENING: Status: ACTIVE | Noted: 2022-04-28

## 2022-04-28 PROBLEM — J34.3 HYPERTROPHY OF INFERIOR NASAL TURBINATE: Status: ACTIVE | Noted: 2022-04-28

## 2022-06-06 PROBLEM — H69.83 EUSTACHIAN TUBE DYSFUNCTION, BILATERAL: Status: ACTIVE | Noted: 2022-06-06

## 2022-06-06 PROBLEM — H10.13 ALLERGIC CONJUNCTIVITIS, BILATERAL: Status: ACTIVE | Noted: 2022-06-06

## 2022-06-06 PROBLEM — J30.1 CHRONIC SEASONAL ALLERGIC RHINITIS DUE TO POLLEN: Status: ACTIVE | Noted: 2022-06-06

## 2022-06-06 PROBLEM — H69.93 EUSTACHIAN TUBE DYSFUNCTION, BILATERAL: Status: ACTIVE | Noted: 2022-06-06

## 2022-06-06 PROBLEM — J45.20 MILD INTERMITTENT ASTHMA WITHOUT COMPLICATION: Status: ACTIVE | Noted: 2022-06-06

## 2022-06-06 PROBLEM — J30.89 ALLERGIC RHINITIS DUE TO DUST MITE: Status: ACTIVE | Noted: 2022-06-06

## 2022-08-14 PROBLEM — J34.89 NASAL OBSTRUCTION: Status: ACTIVE | Noted: 2022-08-14

## 2022-08-14 PROBLEM — J34.829 NASAL VALVE COLLAPSE: Status: ACTIVE | Noted: 2022-08-14

## 2022-08-14 PROBLEM — K90.41 GLUTEN INTOLERANCE: Status: ACTIVE | Noted: 2022-08-14

## 2022-08-14 PROBLEM — E04.1 THYROID NODULE: Status: ACTIVE | Noted: 2022-08-14

## 2022-10-11 PROBLEM — H10.13 ALLERGIC CONJUNCTIVITIS, BILATERAL: Status: RESOLVED | Noted: 2022-06-06 | Resolved: 2022-10-11

## 2022-10-11 PROBLEM — Z13.29 THYROID DISORDER SCREENING: Status: RESOLVED | Noted: 2022-04-28 | Resolved: 2022-10-11

## 2022-11-13 PROBLEM — K90.41 GLUTEN INTOLERANCE: Status: RESOLVED | Noted: 2022-08-14 | Resolved: 2022-11-13

## 2022-11-13 PROBLEM — H92.02 LEFT EAR PAIN: Status: RESOLVED | Noted: 2022-04-28 | Resolved: 2022-11-13

## 2022-11-13 PROBLEM — H69.93 DYSFUNCTION OF BOTH EUSTACHIAN TUBES: Status: RESOLVED | Noted: 2022-04-28 | Resolved: 2022-11-13

## 2024-10-08 ENCOUNTER — HOSPITAL ENCOUNTER (INPATIENT)
Facility: HOSPITAL | Age: 54
LOS: 2 days | Discharge: HOME | DRG: 872 | End: 2024-10-10
Attending: EMERGENCY MEDICINE | Admitting: INTERNAL MEDICINE
Payer: COMMERCIAL

## 2024-10-08 ENCOUNTER — APPOINTMENT (EMERGENCY)
Dept: CT IMAGING | Facility: HOSPITAL | Age: 54
DRG: 872 | End: 2024-10-08
Payer: COMMERCIAL

## 2024-10-08 DIAGNOSIS — N39.0 UTI (URINARY TRACT INFECTION): ICD-10-CM

## 2024-10-08 DIAGNOSIS — N12 PYELONEPHRITIS: Primary | ICD-10-CM

## 2024-10-08 PROBLEM — A41.9 SEPSIS (HCC): Status: ACTIVE | Noted: 2024-10-08

## 2024-10-08 LAB
ALBUMIN SERPL BCG-MCNC: 4 G/DL (ref 3.5–5)
ALP SERPL-CCNC: 73 U/L (ref 34–104)
ALT SERPL W P-5'-P-CCNC: 21 U/L (ref 7–52)
ANION GAP SERPL CALCULATED.3IONS-SCNC: 10 MMOL/L (ref 4–13)
APTT PPP: 29 SECONDS (ref 23–34)
AST SERPL W P-5'-P-CCNC: 18 U/L (ref 13–39)
BACTERIA UR QL AUTO: ABNORMAL /HPF
BASOPHILS # BLD MANUAL: 0 THOUSAND/UL (ref 0–0.1)
BASOPHILS NFR MAR MANUAL: 0 % (ref 0–1)
BILIRUB SERPL-MCNC: 0.55 MG/DL (ref 0.2–1)
BILIRUB UR QL STRIP: NEGATIVE
BUN SERPL-MCNC: 13 MG/DL (ref 5–25)
CALCIUM SERPL-MCNC: 9.5 MG/DL (ref 8.4–10.2)
CHLORIDE SERPL-SCNC: 102 MMOL/L (ref 96–108)
CLARITY UR: ABNORMAL
CO2 SERPL-SCNC: 25 MMOL/L (ref 21–32)
COLOR UR: YELLOW
CREAT SERPL-MCNC: 1.06 MG/DL (ref 0.6–1.3)
EOSINOPHIL # BLD MANUAL: 0 THOUSAND/UL (ref 0–0.4)
EOSINOPHIL NFR BLD MANUAL: 0 % (ref 0–6)
ERYTHROCYTE [DISTWIDTH] IN BLOOD BY AUTOMATED COUNT: 13.2 % (ref 11.6–15.1)
FLUAV AG UPPER RESP QL IA.RAPID: NEGATIVE
FLUBV AG UPPER RESP QL IA.RAPID: NEGATIVE
GFR SERPL CREATININE-BSD FRML MDRD: 60 ML/MIN/1.73SQ M
GLUCOSE SERPL-MCNC: 92 MG/DL (ref 65–140)
GLUCOSE UR STRIP-MCNC: NEGATIVE MG/DL
HCG SERPL QL: NEGATIVE
HCT VFR BLD AUTO: 41.7 % (ref 34.8–46.1)
HGB BLD-MCNC: 13.1 G/DL (ref 11.5–15.4)
HGB UR QL STRIP.AUTO: ABNORMAL
HYALINE CASTS #/AREA URNS LPF: ABNORMAL /LPF
INR PPP: 1.01 (ref 0.85–1.19)
KETONES UR STRIP-MCNC: NEGATIVE MG/DL
LACTATE SERPL-SCNC: 0.8 MMOL/L (ref 0.5–2)
LACTATE SERPL-SCNC: 2.1 MMOL/L (ref 0.5–2)
LEUKOCYTE ESTERASE UR QL STRIP: ABNORMAL
LIPASE SERPL-CCNC: 26 U/L (ref 11–82)
LYMPHOCYTES # BLD AUTO: 1.27 THOUSAND/UL (ref 0.6–4.47)
LYMPHOCYTES # BLD AUTO: 5 % (ref 14–44)
MCH RBC QN AUTO: 27.6 PG (ref 26.8–34.3)
MCHC RBC AUTO-ENTMCNC: 31.4 G/DL (ref 31.4–37.4)
MCV RBC AUTO: 88 FL (ref 82–98)
MONOCYTES # BLD AUTO: 0.51 THOUSAND/UL (ref 0–1.22)
MONOCYTES NFR BLD: 2 % (ref 4–12)
MUCOUS THREADS UR QL AUTO: ABNORMAL
NEUTROPHILS # BLD MANUAL: 23.65 THOUSAND/UL (ref 1.85–7.62)
NEUTS BAND NFR BLD MANUAL: 2 % (ref 0–8)
NEUTS SEG NFR BLD AUTO: 91 % (ref 43–75)
NITRITE UR QL STRIP: NEGATIVE
NON-SQ EPI CELLS URNS QL MICRO: ABNORMAL /HPF
PH UR STRIP.AUTO: 6 [PH]
PLATELET # BLD AUTO: 333 THOUSANDS/UL (ref 149–390)
PLATELET BLD QL SMEAR: ADEQUATE
PMV BLD AUTO: 10 FL (ref 8.9–12.7)
POTASSIUM SERPL-SCNC: 3.6 MMOL/L (ref 3.5–5.3)
PROCALCITONIN SERPL-MCNC: 3.84 NG/ML
PROT SERPL-MCNC: 8 G/DL (ref 6.4–8.4)
PROT UR STRIP-MCNC: ABNORMAL MG/DL
PROTHROMBIN TIME: 14.1 SECONDS (ref 12.3–15)
RBC # BLD AUTO: 4.75 MILLION/UL (ref 3.81–5.12)
RBC #/AREA URNS AUTO: ABNORMAL /HPF
RBC MORPH BLD: NORMAL
SARS-COV+SARS-COV-2 AG RESP QL IA.RAPID: NEGATIVE
SODIUM SERPL-SCNC: 137 MMOL/L (ref 135–147)
SP GR UR STRIP.AUTO: 1.03 (ref 1–1.03)
UROBILINOGEN UR STRIP-ACNC: <2 MG/DL
WBC # BLD AUTO: 25.43 THOUSAND/UL (ref 4.31–10.16)
WBC #/AREA URNS AUTO: ABNORMAL /HPF

## 2024-10-08 PROCEDURE — 85007 BL SMEAR W/DIFF WBC COUNT: CPT | Performed by: EMERGENCY MEDICINE

## 2024-10-08 PROCEDURE — 85610 PROTHROMBIN TIME: CPT | Performed by: EMERGENCY MEDICINE

## 2024-10-08 PROCEDURE — 74177 CT ABD & PELVIS W/CONTRAST: CPT

## 2024-10-08 PROCEDURE — 87040 BLOOD CULTURE FOR BACTERIA: CPT | Performed by: EMERGENCY MEDICINE

## 2024-10-08 PROCEDURE — 99284 EMERGENCY DEPT VISIT MOD MDM: CPT

## 2024-10-08 PROCEDURE — 93005 ELECTROCARDIOGRAM TRACING: CPT

## 2024-10-08 PROCEDURE — 87086 URINE CULTURE/COLONY COUNT: CPT | Performed by: EMERGENCY MEDICINE

## 2024-10-08 PROCEDURE — 83605 ASSAY OF LACTIC ACID: CPT | Performed by: EMERGENCY MEDICINE

## 2024-10-08 PROCEDURE — 84145 PROCALCITONIN (PCT): CPT | Performed by: EMERGENCY MEDICINE

## 2024-10-08 PROCEDURE — 96375 TX/PRO/DX INJ NEW DRUG ADDON: CPT

## 2024-10-08 PROCEDURE — 81001 URINALYSIS AUTO W/SCOPE: CPT | Performed by: EMERGENCY MEDICINE

## 2024-10-08 PROCEDURE — 99223 1ST HOSP IP/OBS HIGH 75: CPT

## 2024-10-08 PROCEDURE — 83690 ASSAY OF LIPASE: CPT | Performed by: EMERGENCY MEDICINE

## 2024-10-08 PROCEDURE — 80053 COMPREHEN METABOLIC PANEL: CPT | Performed by: EMERGENCY MEDICINE

## 2024-10-08 PROCEDURE — 84703 CHORIONIC GONADOTROPIN ASSAY: CPT | Performed by: EMERGENCY MEDICINE

## 2024-10-08 PROCEDURE — 96361 HYDRATE IV INFUSION ADD-ON: CPT

## 2024-10-08 PROCEDURE — 85730 THROMBOPLASTIN TIME PARTIAL: CPT | Performed by: EMERGENCY MEDICINE

## 2024-10-08 PROCEDURE — 87804 INFLUENZA ASSAY W/OPTIC: CPT | Performed by: EMERGENCY MEDICINE

## 2024-10-08 PROCEDURE — 99285 EMERGENCY DEPT VISIT HI MDM: CPT | Performed by: EMERGENCY MEDICINE

## 2024-10-08 PROCEDURE — 96365 THER/PROPH/DIAG IV INF INIT: CPT

## 2024-10-08 PROCEDURE — 87811 SARS-COV-2 COVID19 W/OPTIC: CPT | Performed by: EMERGENCY MEDICINE

## 2024-10-08 PROCEDURE — 85027 COMPLETE CBC AUTOMATED: CPT | Performed by: EMERGENCY MEDICINE

## 2024-10-08 PROCEDURE — 36415 COLL VENOUS BLD VENIPUNCTURE: CPT | Performed by: EMERGENCY MEDICINE

## 2024-10-08 RX ORDER — ONDANSETRON 2 MG/ML
4 INJECTION INTRAMUSCULAR; INTRAVENOUS ONCE
Status: COMPLETED | OUTPATIENT
Start: 2024-10-08 | End: 2024-10-08

## 2024-10-08 RX ORDER — ENOXAPARIN SODIUM 100 MG/ML
40 INJECTION SUBCUTANEOUS DAILY
Status: DISCONTINUED | OUTPATIENT
Start: 2024-10-09 | End: 2024-10-10 | Stop reason: HOSPADM

## 2024-10-08 RX ORDER — TRAMADOL HYDROCHLORIDE 50 MG/1
50 TABLET ORAL EVERY 6 HOURS PRN
Status: DISCONTINUED | OUTPATIENT
Start: 2024-10-08 | End: 2024-10-10 | Stop reason: HOSPADM

## 2024-10-08 RX ORDER — KETOROLAC TROMETHAMINE 30 MG/ML
15 INJECTION, SOLUTION INTRAMUSCULAR; INTRAVENOUS ONCE
Status: COMPLETED | OUTPATIENT
Start: 2024-10-08 | End: 2024-10-08

## 2024-10-08 RX ORDER — ACETAMINOPHEN 325 MG/1
650 TABLET ORAL EVERY 6 HOURS PRN
Status: DISCONTINUED | OUTPATIENT
Start: 2024-10-08 | End: 2024-10-10 | Stop reason: HOSPADM

## 2024-10-08 RX ORDER — OXYCODONE HYDROCHLORIDE 5 MG/1
5 TABLET ORAL EVERY 6 HOURS PRN
Status: DISCONTINUED | OUTPATIENT
Start: 2024-10-08 | End: 2024-10-10 | Stop reason: HOSPADM

## 2024-10-08 RX ADMIN — OXYCODONE HYDROCHLORIDE 5 MG: 5 TABLET ORAL at 20:57

## 2024-10-08 RX ADMIN — ONDANSETRON 4 MG: 2 INJECTION INTRAMUSCULAR; INTRAVENOUS at 14:19

## 2024-10-08 RX ADMIN — SODIUM CHLORIDE 1000 ML: 0.9 INJECTION, SOLUTION INTRAVENOUS at 14:20

## 2024-10-08 RX ADMIN — KETOROLAC TROMETHAMINE 15 MG: 30 INJECTION, SOLUTION INTRAMUSCULAR at 14:19

## 2024-10-08 RX ADMIN — IOHEXOL 100 ML: 350 INJECTION, SOLUTION INTRAVENOUS at 15:35

## 2024-10-08 RX ADMIN — CEFTRIAXONE SODIUM 2000 MG: 10 INJECTION, POWDER, FOR SOLUTION INTRAVENOUS at 15:39

## 2024-10-08 NOTE — ASSESSMENT & PLAN NOTE
Complaining of chills and subjective fever, left flank and left lower quadrant pain,  urinary frequency starting since Saturday.  Denies any pelvic discharge, changes in sexual life.  Patient reports history of endometriosis however no recent flares.  Started on antibiotic.  Adjust antibiotic according to sensitivity,   see the rest of the plan for sepsis.

## 2024-10-08 NOTE — PLAN OF CARE
Problem: GASTROINTESTINAL - ADULT  Goal: Minimal or absence of nausea and/or vomiting  Description: INTERVENTIONS:  - Administer IV fluids if ordered to ensure adequate hydration  - Maintain NPO status until nausea and vomiting are resolved  - Nasogastric tube if ordered  - Administer ordered antiemetic medications as needed  - Provide nonpharmacologic comfort measures as appropriate  - Advance diet as tolerated, if ordered  - Consider nutrition services referral to assist patient with adequate nutrition and appropriate food choices  Outcome: Progressing  Goal: Maintains or returns to baseline bowel function  Description: INTERVENTIONS:  - Assess bowel function  - Encourage oral fluids to ensure adequate hydration  - Administer IV fluids if ordered to ensure adequate hydration  - Administer ordered medications as needed  - Encourage mobilization and activity  - Consider nutritional services referral to assist patient with adequate nutrition and appropriate food choices  Outcome: Progressing  Goal: Maintains adequate nutritional intake  Description: INTERVENTIONS:  - Monitor percentage of each meal consumed  - Identify factors contributing to decreased intake, treat as appropriate  - Assist with meals as needed  - Monitor I&O, weight, and lab values if indicated  - Obtain nutrition services referral as needed  Outcome: Progressing  Goal: Establish and maintain optimal ostomy function  Description: INTERVENTIONS:  - Assess bowel function  - Encourage oral fluids to ensure adequate hydration  - Administer IV fluids if ordered to ensure adequate hydration   - Administer ordered medications as needed  - Encourage mobilization and activity  - Nutrition services referral to assist patient with appropriate food choices  - Assess stoma site  - Consider wound care consult   Outcome: Progressing  Goal: Oral mucous membranes remain intact  Description: INTERVENTIONS  - Assess oral mucosa and hygiene practices  - Implement  preventative oral hygiene regimen  - Implement oral medicated treatments as ordered  - Initiate Nutrition services referral as needed  Outcome: Progressing     Problem: MUSCULOSKELETAL - ADULT  Goal: Maintain or return mobility to safest level of function  Description: INTERVENTIONS:  - Assess patient's ability to carry out ADLs; assess patient's baseline for ADL function and identify physical deficits which impact ability to perform ADLs (bathing, care of mouth/teeth, toileting, grooming, dressing, etc.)  - Assess/evaluate cause of self-care deficits   - Assess range of motion  - Assess patient's mobility  - Assess patient's need for assistive devices and provide as appropriate  - Encourage maximum independence but intervene and supervise when necessary  - Involve family in performance of ADLs  - Assess for home care needs following discharge   - Consider OT consult to assist with ADL evaluation and planning for discharge  - Provide patient education as appropriate  Outcome: Progressing  Goal: Maintain proper alignment of affected body part  Description: INTERVENTIONS:  - Support, maintain and protect limb and body alignment  - Provide patient/ family with appropriate education  Outcome: Progressing

## 2024-10-08 NOTE — ASSESSMENT & PLAN NOTE
Presenting on admission due to tachycardia and tachypnea.  WBC elevated 25k.  Patient complaining of urinary frequency and left lower quadrant and flank pain.  Patient reports chills and fever since Saturday  Urinalysis abnormal with concern for UTI, innumerable WBC.  CT abdomen and pelvis with contrast shows no hydronephrosis, perinephric stranding or renal calculi.  Patient has history of Crohn's but does not take any medication for the disease.  Denies previous history of UTIs.  Elevated lactic acid that improved after IV fluids and elevated procalcitonin.    Plan:  Follow-up on blood cultures and urine culture  Continue with IV antibiotics ceftriaxone 1 g every 24 hours and change antibiotics according to sensitivities.  Follow-up on fever curve and CBC.

## 2024-10-08 NOTE — H&P
H&P - Hospitalist   Name: Mely Astorga 54 y.o. female I MRN: [id number]  Unit/Bed#: -01 I Date of Admission: 10/8/2024   Date of Service: 10/8/2024 I Hospital Day: 0     Assessment & Plan  Sepsis (HCC)  Presenting on admission due to tachycardia and tachypnea.  WBC elevated 25k.  Patient complaining of urinary frequency and left lower quadrant and flank pain.  Patient reports chills and fever since Saturday  Urinalysis abnormal with concern for UTI, innumerable WBC.  CT abdomen and pelvis with contrast shows no hydronephrosis, perinephric stranding or renal calculi.  Patient has history of Crohn's but does not take any medication for the disease.  Denies previous history of UTIs.  Elevated lactic acid that improved after IV fluids and elevated procalcitonin.    Plan:  Follow-up on blood cultures and urine culture  Continue with IV antibiotics ceftriaxone 1 g every 24 hours and change antibiotics according to sensitivities.  Follow-up on fever curve and CBC.    UTI (urinary tract infection)  Complaining of chills and subjective fever, left flank and left lower quadrant pain,  urinary frequency starting since Saturday.  Denies any pelvic discharge, changes in sexual life.  Patient reports history of endometriosis however no recent flares.  Started on antibiotic.  Adjust antibiotic according to sensitivity,   see the rest of the plan for sepsis.    Mild intermittent asthma without complication  Taking as needed albuterol inhaler.    Last flare happened several years ago  Crohn's disease of colon without complication (HCC)  History of Crohn disease s/p bowel resection several years ago.  Not actively treating the disease.  Patient denies any recent flares.      VTE Pharmacologic Prophylaxis: VTE Score: 4 Moderate Risk (Score 3-4) - Pharmacological DVT Prophylaxis Ordered: enoxaparin (Lovenox).  Code Status: Level 1 - Full Code patient  Discussion with family: Patient declined call to .      Anticipated Length of Stay: Patient will be admitted on an inpatient basis with an anticipated length of stay of greater than 2 midnights secondary to urinary tract infection requiring IV antibiotics.    History of Present Illness   Chief Complaint: Chills, fever, flank pain, urinary frequency    Mely Astorga is a 54 y.o. female with a PMH of chron's disease, and mild intermittent asthma, endometriosis who presents with chills fever fatigue, left flank/lower quadrant pain starting on Saturday.  Patient reports that symptoms started to subside the next few days after the onset, however as of today she started to feel the chills, fatigue, urinary frequency again.  She took Tylenol as needed with some improvement in the symptoms.  Patient denies previous episodes of UTIs, recent use of antibiotics, urinary tract manipulation or changes in sexual life.  Patient reports history of endometriosis.  No changes concerning for pyelonephritis was noted on CT scan with contrast.  Patient admitted for sepsis workup and management.    Review of Systems   Constitutional:  Positive for activity change, chills and fever (Subjective).   HENT:  Negative for ear pain and sore throat.    Eyes:  Negative for pain and visual disturbance.   Respiratory:  Negative for cough and shortness of breath.    Cardiovascular:  Negative for chest pain and palpitations.   Gastrointestinal:  Negative for abdominal pain and vomiting.   Genitourinary:  Positive for flank pain (Left side) and frequency. Negative for difficulty urinating, dysuria, hematuria, pelvic pain, vaginal bleeding, vaginal discharge and vaginal pain.   Musculoskeletal:  Negative for arthralgias and back pain.   Skin:  Negative for color change and rash.   Neurological:  Negative for seizures and syncope.   All other systems reviewed and are negative.      Historical Information   Past Medical History:   Diagnosis Date    Crohn disease (HCC)     Dizziness     Ear  problems     Nasal congestion     Otitis media      Past Surgical History:   Procedure Laterality Date    LAPAROSCOPIC COLON RESECTION       Social History     Tobacco Use    Smoking status: Never    Smokeless tobacco: Never   Vaping Use    Vaping status: Never Used   Substance and Sexual Activity    Alcohol use: Yes    Drug use: Never    Sexual activity: Not on file     E-Cigarette/Vaping    E-Cigarette Use Never User      E-Cigarette/Vaping Substances     History reviewed. No pertinent family history.  Social History:  Marital Status: Single   Occupation:   Patient Pre-hospital Living Situation: Home  Patient Pre-hospital Level of Mobility: walks  Patient Pre-hospital Diet Restrictions: none    Meds/Allergies   Medications Prior to Admission   Medication Sig Dispense Refill Last Dispense    fluticasone (FLONASE) 50 mcg/act nasal spray 1 spray into each nostril daily (Patient taking differently: 1 spray into each nostril daily As needed) 16 g 0 Unknown (outside pharmacy)    albuterol (2.5 mg/3 mL) 0.083 % nebulizer solution Take 1 vial (2.5 mg total) by nebulization every 6 (six) hours as needed for wheezing or shortness of breath 75 mL 0 Unknown (outside pharmacy)    albuterol (PROVENTIL HFA,VENTOLIN HFA) 90 mcg/act inhaler Inhale 2 puffs every 4 (four) hours as needed for wheezing 1 Inhaler 0 Unknown (outside pharmacy)    azelastine (ASTELIN) 0.1 % nasal spray 2 sprays into each nostril 2 (two) times a day 30 mL 11 Unknown (outside pharmacy)    meclizine (ANTIVERT) 25 mg tablet Take 1 tablet (25 mg total) by mouth 3 (three) times a day as needed for dizziness 25 tablet 0 Unknown (outside pharmacy)     No Known Allergies    Objective :  Temp:  [97.6 °F (36.4 °C)-98.4 °F (36.9 °C)] 98.4 °F (36.9 °C)  HR:  [] 85  BP: (119-137)/(56-68) 121/68  Resp:  [20-21] 20  SpO2:  [96 %-99 %] 96 %  O2 Device: None (Room air)    Physical Exam  Vitals and nursing note reviewed.   Constitutional:       General: She is not in  "acute distress.     Appearance: She is well-developed.   HENT:      Head: Normocephalic and atraumatic.   Eyes:      Conjunctiva/sclera: Conjunctivae normal.   Cardiovascular:      Rate and Rhythm: Normal rate and regular rhythm.      Heart sounds: No murmur heard.  Pulmonary:      Effort: Pulmonary effort is normal. No respiratory distress.      Breath sounds: Normal breath sounds. No wheezing or rales.   Abdominal:      Palpations: Abdomen is soft.      Tenderness: There is abdominal tenderness (Left flank and left lower quadrant, suprapubic). There is no right CVA tenderness or left CVA tenderness.   Musculoskeletal:         General: No swelling or signs of injury.      Cervical back: Neck supple.      Right lower leg: No edema.   Skin:     General: Skin is warm and dry.      Capillary Refill: Capillary refill takes less than 2 seconds.      Findings: No rash.   Neurological:      Mental Status: She is alert and oriented to person, place, and time.   Psychiatric:         Mood and Affect: Mood normal.         Lines/Drains:            Lab Results: I have reviewed the following results:  Results from last 7 days   Lab Units 10/08/24  1432   WBC Thousand/uL 25.43*   HEMOGLOBIN g/dL 13.1   HEMATOCRIT % 41.7   PLATELETS Thousands/uL 333   BANDS PCT % 2   LYMPHO PCT % 5*   MONO PCT % 2*   EOS PCT % 0     Results from last 7 days   Lab Units 10/08/24  1432   SODIUM mmol/L 137   POTASSIUM mmol/L 3.6   CHLORIDE mmol/L 102   CO2 mmol/L 25   BUN mg/dL 13   CREATININE mg/dL 1.06   ANION GAP mmol/L 10   CALCIUM mg/dL 9.5   ALBUMIN g/dL 4.0   TOTAL BILIRUBIN mg/dL 0.55   ALK PHOS U/L 73   ALT U/L 21   AST U/L 18   GLUCOSE RANDOM mg/dL 92     Results from last 7 days   Lab Units 10/08/24  1432   INR  1.01         No results found for: \"HGBA1C\"  Results from last 7 days   Lab Units 10/08/24  1705 10/08/24  1432   LACTIC ACID mmol/L 0.8 2.1*   PROCALCITONIN ng/ml  --  3.84*       Imaging Results Review: I reviewed radiology " reports from this admission including: CT abdomen/pelvis.  Other Study Results Review: No additional pertinent studies reviewed.      ** Please Note: This note has been constructed using a voice recognition system. **

## 2024-10-08 NOTE — ED PROVIDER NOTES
Final diagnoses:   Pyelonephritis     ED Disposition       ED Disposition   Admit    Condition   Stable    Date/Time   Tue Oct 8, 2024  4:36 PM    Comment   Case was discussed with KAYLIN and the patient's admission status was agreed to be Admission Status: inpatient status to the service of Dr. Padilla .               Assessment & Plan       Medical Decision Making  53 y/o female with abd pain, back pain, chills, and urinary symptoms - will get septic workup, UA, urine preg, and ct abd/pel. Will give toradol/ fluids, and reassess.     Amount and/or Complexity of Data Reviewed  Labs: ordered. Decision-making details documented in ED Course.  Radiology: ordered.    Risk  Prescription drug management.  Decision regarding hospitalization.        ED Course as of 10/08/24 1727   Tue Oct 08, 2024   1507 WBC(!): 25.43   1539 WBC, UA(!): Innumerable       Medications   sodium chloride 0.9 % bolus 1,000 mL (0 mL Intravenous Stopped 10/8/24 1705)   ketorolac (TORADOL) injection 15 mg (15 mg Intravenous Given 10/8/24 1419)   ondansetron (ZOFRAN) injection 4 mg (4 mg Intravenous Given 10/8/24 1419)   ceftriaxone (ROCEPHIN) 2 g/50 mL in dextrose IVPB (0 mg Intravenous Stopped 10/8/24 1705)   iohexol (OMNIPAQUE) 350 MG/ML injection (MULTI-DOSE) 100 mL (100 mL Intravenous Given 10/8/24 1535)       ED Risk Strat Scores                           SBIRT 22yo+      Flowsheet Row Most Recent Value   Initial Alcohol Screen: US AUDIT-C     1. How often do you have a drink containing alcohol? 0 Filed at: 10/08/2024 1420   2. How many drinks containing alcohol do you have on a typical day you are drinking?  0 Filed at: 10/08/2024 1420   3a. Male UNDER 65: How often do you have five or more drinks on one occasion? 0 Filed at: 10/08/2024 1420   3b. FEMALE Any Age, or MALE 65+: How often do you have 4 or more drinks on one occassion? 0 Filed at: 10/08/2024 1420   Audit-C Score 0 Filed at: 10/08/2024 1420   ALEX: How many times in the past  year have you...    Used an illegal drug or used a prescription medication for non-medical reasons? Never Filed at: 10/08/2024 1330                            History of Present Illness       Chief Complaint   Patient presents with    Back Pain    Flu Symptoms     Body aches, abd pain, back pain, and chills since Friday.        Past Medical History:   Diagnosis Date    Crohn disease (HCC)     Dizziness     Ear problems     Nasal congestion     Otitis media       Past Surgical History:   Procedure Laterality Date    LAPAROSCOPIC COLON RESECTION        History reviewed. No pertinent family history.   Social History     Tobacco Use    Smoking status: Never    Smokeless tobacco: Never   Vaping Use    Vaping status: Never Used   Substance Use Topics    Alcohol use: Yes    Drug use: Never      E-Cigarette/Vaping    E-Cigarette Use Never User       E-Cigarette/Vaping Substances      I have reviewed and agree with the history as documented.     55 y/o female, hx of crohns s/p colon resection, presents to the ED for chills, back pain and abd pain x 10 days ago. Had lower abd pain and back pain 10 days ago, lasted 3 days then resolved. States that symptoms returned 2 days ago and were worse. She reports constant sharp/stabbing pain that radiates to the low back. States that the L side is worse then the right. She has tried tylenol yesterday with mild relief. No fever. Has had urinary frequency. No dysuria or urgency or hematuria. No n/v or d/c. No blood in her stool.       History provided by:  Patient  Flu Symptoms  Presenting symptoms: no cough, no diarrhea, no fever, no headaches, no nausea, no shortness of breath, no sore throat and no vomiting    Associated symptoms: no chills, no ear pain, no congestion and no neck stiffness        Review of Systems   Constitutional:  Negative for chills and fever.   HENT:  Negative for congestion, ear pain and sore throat.    Eyes:  Negative for pain and visual disturbance.    Respiratory:  Negative for cough, shortness of breath and wheezing.    Cardiovascular:  Negative for chest pain and leg swelling.   Gastrointestinal:  Positive for abdominal pain. Negative for diarrhea, nausea and vomiting.   Genitourinary:  Positive for flank pain and frequency. Negative for dysuria, hematuria and urgency.   Musculoskeletal:  Positive for back pain. Negative for neck pain and neck stiffness.   Skin:  Negative for rash and wound.   Neurological:  Negative for weakness, numbness and headaches.   Psychiatric/Behavioral:  Negative for agitation and confusion.    All other systems reviewed and are negative.          Objective       ED Triage Vitals [10/08/24 1247]   Temperature Pulse Blood Pressure Respirations SpO2 Patient Position - Orthostatic VS   97.6 °F (36.4 °C) (!) 119 137/66 20 99 % Sitting      Temp Source Heart Rate Source BP Location FiO2 (%) Pain Score    Temporal Monitor Left arm -- --      Vitals      Date and Time Temp Pulse SpO2 Resp BP Pain Score FACES Pain Rating User   10/08/24 1700 -- 86 99 % 21 119/58 -- -- [facility]   10/08/24 1457 -- 99 97 % 20 120/56 -- -- [facility]   10/08/24 1247 97.6 °F (36.4 °C) 119 99 % 20 137/66 -- -- GP            Physical Exam  Vitals and nursing note reviewed.   Constitutional:       Appearance: She is well-developed.   HENT:      Head: Normocephalic and atraumatic.   Eyes:      Pupils: Pupils are equal, round, and reactive to light.   Cardiovascular:      Rate and Rhythm: Normal rate and regular rhythm.   Pulmonary:      Effort: Pulmonary effort is normal.      Breath sounds: Normal breath sounds.   Abdominal:      General: Bowel sounds are normal.      Palpations: Abdomen is soft.      Tenderness: There is abdominal tenderness in the right lower quadrant, suprapubic area and left lower quadrant. There is left CVA tenderness.   Musculoskeletal:         General: Normal range of motion.      Cervical back: Normal range of motion and neck supple.   Skin:      General: Skin is warm and dry.   Neurological:      General: No focal deficit present.      Mental Status: She is alert and oriented to person, place, and time.      Comments: No focal deficits         Results Reviewed       Procedure Component Value Units Date/Time    Lactic acid 2 Hours [[id number]]  (Normal) Collected: 10/08/24 1705    Lab Status: Final result Specimen: Blood from Arm, Right Updated: 10/08/24 1725     LACTIC ACID 0.8 mmol/L     Narrative:      Result may be elevated if tourniquet was used during collection.    RBC Morphology Reflex Test [[id number]] Collected: 10/08/24 1432    Lab Status: Final result Specimen: Blood from Arm, Right Updated: 10/08/24 1601    CBC and differential [[id number]]  (Abnormal) Collected: 10/08/24 1432    Lab Status: Final result Specimen: Blood from Arm, Right Updated: 10/08/24 1537     WBC 25.43 Thousand/uL      RBC 4.75 Million/uL      Hemoglobin 13.1 g/dL      Hematocrit 41.7 %      MCV 88 fL      MCH 27.6 pg      MCHC 31.4 g/dL      RDW 13.2 %      MPV 10.0 fL      Platelets 333 Thousands/uL     Narrative:      This is an appended report.  These results have been appended to a previously verified report.    Manual Differential(PHLEBS Do Not Order) [[id number]]  (Abnormal) Collected: 10/08/24 1432    Lab Status: Final result Specimen: Blood from Arm, Right Updated: 10/08/24 1537     Segmented % 91 %      Bands % 2 %      Lymphocytes % 5 %      Monocytes % 2 %      Eosinophils % 0 %      Basophils % 0 %      Absolute Neutrophils 23.65 Thousand/uL      Absolute Lymphocytes 1.27 Thousand/uL      Absolute Monocytes 0.51 Thousand/uL      Absolute Eosinophils 0.00 Thousand/uL      Absolute Basophils 0.00 Thousand/uL      Total Counted --     RBC Morphology Normal     Platelet Estimate Adequate    Urine Microscopic [[id number]]  (Abnormal) Collected: 10/08/24 1515    Lab Status: Final result Specimen: Urine, Clean Catch Updated: 10/08/24 1529     RBC, UA 20-30 /hpf      WBC, UA  Innumerable /hpf      Epithelial Cells Occasional /hpf      Bacteria, UA Occasional /hpf      MUCUS THREADS Moderate     Hyaline Casts, UA 3-5 /lpf     Urine culture [[id number]] Collected: 10/08/24 1515    Lab Status: In process Specimen: Urine, Clean Catch Updated: 10/08/24 1529    UA w Reflex to Microscopic w Reflex to Culture [[id number]]  (Abnormal) Collected: 10/08/24 1515    Lab Status: Final result Specimen: Urine, Clean Catch Updated: 10/08/24 1526     Color, UA Yellow     Clarity, UA Turbid     Specific Gravity, UA 1.026     pH, UA 6.0     Leukocytes, UA Large     Nitrite, UA Negative     Protein, UA Trace mg/dl      Glucose, UA Negative mg/dl      Ketones, UA Negative mg/dl      Urobilinogen, UA <2.0 mg/dl      Bilirubin, UA Negative     Occult Blood, UA Trace    Procalcitonin [[id number]]  (Abnormal) Collected: 10/08/24 1432    Lab Status: Final result Specimen: Blood from Arm, Right Updated: 10/08/24 1508     Procalcitonin 3.84 ng/ml     hCG, qualitative pregnancy [[id number]]  (Normal) Collected: 10/08/24 1432    Lab Status: Final result Specimen: Blood from Arm, Right Updated: 10/08/24 1507     Preg, Serum Negative    Lactic acid [[id number]]  (Abnormal) Collected: 10/08/24 1432    Lab Status: Final result Specimen: Blood from Arm, Right Updated: 10/08/24 1503     LACTIC ACID 2.1 mmol/L     Narrative:      Result may be elevated if tourniquet was used during collection.    Comprehensive metabolic panel [[id number]] Collected: 10/08/24 1432    Lab Status: Final result Specimen: Blood from Arm, Right Updated: 10/08/24 1459     Sodium 137 mmol/L      Potassium 3.6 mmol/L      Chloride 102 mmol/L      CO2 25 mmol/L      ANION GAP 10 mmol/L      BUN 13 mg/dL      Creatinine 1.06 mg/dL      Glucose 92 mg/dL      Calcium 9.5 mg/dL      AST 18 U/L      ALT 21 U/L      Alkaline Phosphatase 73 U/L      Total Protein 8.0 g/dL      Albumin 4.0 g/dL      Total Bilirubin 0.55 mg/dL      eGFR 60 ml/min/1.73sq m      Narrative:      National Kid[employer] Disease Foundation guidelines for Chronic Kidney Disease (CKD):     Stage 1 with normal or high GFR (GFR > 90 mL/min/1.73 square meters)    Stage 2 Mild CKD (GFR = 60-89 mL/min/1.73 square meters)    Stage 3A Moderate CKD (GFR = 45-59 mL/min/1.73 square meters)    Stage 3B Moderate CKD (GFR = 30-44 mL/min/1.73 square meters)    Stage 4 Severe CKD (GFR = 15-29 mL/min/1.73 square meters)    Stage 5 End Stage CKD (GFR <15 mL/min/1.73 square meters)  Note: GFR calculation is accurate only with a steady state creatinine    Lipase [[id number]]  (Normal) Collected: 10/08/24 1432    Lab Status: Final result Specimen: Blood from Arm, Right Updated: 10/08/24 1459     Lipase 26 u/L     FLU/COVID Rapid Antigen (30 min. TAT) - Preferred screening test in ED [[id number]]  (Normal) Collected: 10/08/24 1431    Lab Status: Final result Specimen: Nares from Nose Updated: 10/08/24 1458     SARS COV Rapid Antigen Negative     Influenza A Rapid Antigen Negative     Influenza B Rapid Antigen Negative    Narrative:      This test has been performed using the Quidel Kelsey 2 FLU+SARS Antigen test under the Emergency Use Authorization (EUA). This test has been validated by the  and verified by the performing laboratory. The Kelsey uses lateral flow immunofluorescent sandwich assay to detect SARS-COV, Influenza A and Influenza B Antigen.     The Quidel Kelsey 2 SARS Antigen test does not differentiate between SARS-CoV and SARS-CoV-2.     Negative results are presumptive and may be confirmed with a molecular assay, if necessary, for patient management. Negative results do not rule out SARS-CoV-2 or influenza infection and should not be used as the sole basis for treatment or patient management decisions. A negative test result may occur if the level of antigen in a sample is below the limit of detection of this test.     Positive results are indicative of the presence of viral antigens, but do not rule  out bacterial infection or co-infection with other viruses.     All test results should be used as an adjunct to clinical observations and other information available to the provider.    FOR PEDIATRIC PATIENTS - copy/paste COVID Guidelines URL to browser: [email or web address]    Protime-INR [[id number]]  (Normal) Collected: 10/08/24 1432    Lab Status: Final result Specimen: Blood from Arm, Right Updated: 10/08/24 1455     Protime 14.1 seconds      INR 1.01    Narrative:      INR Therapeutic Range    Indication                                             INR Range      Atrial Fibrillation                                               2.0-3.0  Hypercoagulable State                                    2.0.2.3  Left Ventricular Asist Device                            2.0-3.0  Mechanical Heart Valve                                  -    Aortic(with afib, MI, embolism, HF, LA enlargement,    and/or coagulopathy)                                     2.0-3.0 (2.5-3.5)     Mitral                                                             2.5-3.5  Prosthetic/Bioprosthetic Heart Valve               2.0-3.0  Venous thromboembolism (VTE: VT, PE        2.0-3.0    APTT [[id number]]  (Normal) Collected: 10/08/24 1432    Lab Status: Final result Specimen: Blood from Arm, Right Updated: 10/08/24 1455     PTT 29 seconds     Blood culture #1 [[id number]] Collected: 10/08/24 1431    Lab Status: In process Specimen: Blood from Arm, Left Updated: 10/08/24 1438    Blood culture #2 [[id number]] Collected: 10/08/24 1432    Lab Status: In process Specimen: Blood from Arm, Right Updated: 10/08/24 1438    POCT pregnancy, urine [[id number]]     Lab Status: No result Specimen: Urine             CT abdomen pelvis with contrast   Final Interpretation by Milind Weiss MD (10/08 1623)         1. No acute abnormality identified in the abdomen or pelvis.   2. Large myomatous uterus.   3.  Nonemergent findings as noted.               Workstation performed: [id number]             Procedures    ED Medication and Procedure Management   Prior to Admission Medications   Prescriptions Last Dose Informant Patient Reported? Taking?   albuterol (2.5 mg/3 mL) 0.083 % nebulizer solution   No No   Sig: Take 1 vial (2.5 mg total) by nebulization every 6 (six) hours as needed for wheezing or shortness of breath   albuterol (PROVENTIL HFA,VENTOLIN HFA) 90 mcg/act inhaler   No No   Sig: Inhale 2 puffs every 4 (four) hours as needed for wheezing   azelastine (ASTELIN) 0.1 % nasal spray   No No   Si sprays into each nostril 2 (two) times a day   fluticasone (FLONASE) 50 mcg/act nasal spray   No No   Si spray into each nostril daily   Patient taking differently: 1 spray into each nostril daily As needed   fluticasone-vilanterol (Breo Ellipta) 100-25 mcg/inh inhaler   No No   Sig: Inhale 1 puff daily Rinse mouth after use.   meclizine (ANTIVERT) 25 mg tablet   No No   Sig: Take 1 tablet (25 mg total) by mouth 3 (three) times a day as needed for dizziness      Facility-Administered Medications: None     Patient's Medications   Discharge Prescriptions    No medications on file     No discharge procedures on file.  ED SEPSIS DOCUMENTATION   Time reflects when diagnosis was documented in both MDM as applicable and the Disposition within this note       Time User Action Codes Description Comment    10/8/2024  4:37 PM Peggy Mathis Add [N12] Pyelonephritis                  Peggy Mathis DO  10/08/24 8036

## 2024-10-08 NOTE — ASSESSMENT & PLAN NOTE
History of Crohn disease s/p bowel resection several years ago.  Not actively treating the disease.  Patient denies any recent flares.

## 2024-10-09 LAB
ANION GAP SERPL CALCULATED.3IONS-SCNC: 6 MMOL/L (ref 4–13)
ATRIAL RATE: 106 BPM
BACTERIA UR CULT: NORMAL
BASOPHILS # BLD AUTO: 0.03 THOUSANDS/ΜL (ref 0–0.1)
BASOPHILS NFR BLD AUTO: 0 % (ref 0–1)
BUN SERPL-MCNC: 17 MG/DL (ref 5–25)
CALCIUM SERPL-MCNC: 8.7 MG/DL (ref 8.4–10.2)
CHLORIDE SERPL-SCNC: 107 MMOL/L (ref 96–108)
CO2 SERPL-SCNC: 24 MMOL/L (ref 21–32)
CREAT SERPL-MCNC: 0.9 MG/DL (ref 0.6–1.3)
EOSINOPHIL # BLD AUTO: 0.03 THOUSAND/ΜL (ref 0–0.61)
EOSINOPHIL NFR BLD AUTO: 0 % (ref 0–6)
ERYTHROCYTE [DISTWIDTH] IN BLOOD BY AUTOMATED COUNT: 13.6 % (ref 11.6–15.1)
GFR SERPL CREATININE-BSD FRML MDRD: 73 ML/MIN/1.73SQ M
GLUCOSE SERPL-MCNC: 105 MG/DL (ref 65–140)
GLUCOSE SERPL-MCNC: 109 MG/DL (ref 65–140)
GLUCOSE SERPL-MCNC: 163 MG/DL (ref 65–140)
GLUCOSE SERPL-MCNC: 86 MG/DL (ref 65–140)
HCT VFR BLD AUTO: 34.8 % (ref 34.8–46.1)
HGB BLD-MCNC: 10.9 G/DL (ref 11.5–15.4)
IMM GRANULOCYTES # BLD AUTO: 0.07 THOUSAND/UL (ref 0–0.2)
IMM GRANULOCYTES NFR BLD AUTO: 1 % (ref 0–2)
LYMPHOCYTES # BLD AUTO: 1 THOUSANDS/ΜL (ref 0.6–4.47)
LYMPHOCYTES NFR BLD AUTO: 8 % (ref 14–44)
MCH RBC QN AUTO: 27.7 PG (ref 26.8–34.3)
MCHC RBC AUTO-ENTMCNC: 31.3 G/DL (ref 31.4–37.4)
MCV RBC AUTO: 88 FL (ref 82–98)
MONOCYTES # BLD AUTO: 0.54 THOUSAND/ΜL (ref 0.17–1.22)
MONOCYTES NFR BLD AUTO: 4 % (ref 4–12)
NEUTROPHILS # BLD AUTO: 11.67 THOUSANDS/ΜL (ref 1.85–7.62)
NEUTS SEG NFR BLD AUTO: 87 % (ref 43–75)
NRBC BLD AUTO-RTO: 0 /100 WBCS
P AXIS: 53 DEGREES
PLATELET # BLD AUTO: 317 THOUSANDS/UL (ref 149–390)
PMV BLD AUTO: 10.5 FL (ref 8.9–12.7)
POTASSIUM SERPL-SCNC: 3.6 MMOL/L (ref 3.5–5.3)
PR INTERVAL: 136 MS
QRS AXIS: 21 DEGREES
QRSD INTERVAL: 80 MS
QT INTERVAL: 318 MS
QTC INTERVAL: 422 MS
RBC # BLD AUTO: 3.94 MILLION/UL (ref 3.81–5.12)
SODIUM SERPL-SCNC: 137 MMOL/L (ref 135–147)
T WAVE AXIS: -3 DEGREES
VENTRICULAR RATE: 106 BPM
WBC # BLD AUTO: 13.34 THOUSAND/UL (ref 4.31–10.16)

## 2024-10-09 PROCEDURE — 99232 SBSQ HOSP IP/OBS MODERATE 35: CPT

## 2024-10-09 PROCEDURE — 82948 REAGENT STRIP/BLOOD GLUCOSE: CPT

## 2024-10-09 PROCEDURE — 93010 ELECTROCARDIOGRAM REPORT: CPT | Performed by: INTERNAL MEDICINE

## 2024-10-09 PROCEDURE — 85025 COMPLETE CBC W/AUTO DIFF WBC: CPT

## 2024-10-09 PROCEDURE — 80048 BASIC METABOLIC PNL TOTAL CA: CPT

## 2024-10-09 RX ADMIN — ENOXAPARIN SODIUM 40 MG: 40 INJECTION SUBCUTANEOUS at 08:52

## 2024-10-09 RX ADMIN — CEFTRIAXONE SODIUM 1000 MG: 10 INJECTION, POWDER, FOR SOLUTION INTRAVENOUS at 15:21

## 2024-10-09 NOTE — UTILIZATION REVIEW
Initial Clinical Review    Admission: Date/Time/Statement:   Admission Orders (From admission, onward)       Ordered        10/08/24 1636  Inpatient Admission  Once                          Orders Placed This Encounter   Procedures    Inpatient Admission     Standing Status:   Standing     Number of Occurrences:   1     Order Specific Question:   Level of Care     Answer:   Med Surg [16]     Order Specific Question:   Estimated length of stay     Answer:   More than 2 Midnights     Order Specific Question:   Certification     Answer:   I certify that inpatient services are medically necessary for this patient for a duration of greater than two midnights. See H&P and MD Progress Notes for additional information about the patient's course of treatment.     ED Arrival Information       Expected   -    Arrival   10/8/2024 12:35    Acuity   Urgent              Means of arrival   Walk-In    Escorted by   Self    Service   Hospitalist    Admission type   Emergency              Arrival complaint   back pain             Chief Complaint   Patient presents with    Back Pain    Flu Symptoms     Body aches, abd pain, back pain, and chills since Friday.        Initial Presentation: 54 y.o. female to the ED from home with complaints of back pain, chills, urinary symptoms. Admitted to inpatient for sepsis, uti.  H/O chron's disease, and mild intermittent asthma, endometriosis . Arrives with tachycardic, left cva tenderness, R and L lower quadrant and suprapubic abdominal pain. WBcs 25.  Blood and urine cultures pending. Started on IV fluids and IV abx.  CT A/p shows no acute findings.     Anticipated Length of Stay/Certification Statement: Patient will be admitted on an inpatient basis with an anticipated length of stay of greater than 2 midnights secondary to urinary tract infection requiring IV antibiotics.     Date: 10/9   Day 2:  H/o Crohns takes no meds for it.  Elevated lactic acid improved after iV fluids, elevated procal.  Follow up blood cultures. Continue IV abx.  FOllow up fever curve and CBC. Has left lower quad tenderness. Wbc improving.    ED Treatment-Medication Administration from 10/08/2024 1235 to 10/08/2024 1735         Date/Time Order Dose Route Action     10/08/2024 1420 sodium chloride 0.9 % bolus 1,000 mL 1,000 mL Intravenous New Bag     10/08/2024 1419 ketorolac (TORADOL) injection 15 mg 15 mg Intravenous Given     10/08/2024 1419 ondansetron (ZOFRAN) injection 4 mg 4 mg Intravenous Given     10/08/2024 1539 ceftriaxone (ROCEPHIN) 2 g/50 mL in dextrose IVPB 2,000 mg Intravenous New Bag     10/08/2024 1535 iohexol (OMNIPAQUE) 350 MG/ML injection (MULTI-DOSE) 100 mL 100 mL Intravenous Given            Scheduled Medications:  cefTRIAXone, 1,000 mg, Intravenous, Q24H  enoxaparin, 40 mg, Subcutaneous, Daily      Continuous IV Infusions:     PRN Meds:  acetaminophen, 650 mg, Oral, Q6H PRN  oxyCODONE, 5 mg, Oral, Q6H PRN  traMADol, 50 mg, Oral, Q6H PRN      ED Triage Vitals   Temperature Pulse Respirations Blood Pressure SpO2 Pain Score   10/08/24 1247 10/08/24 1247 10/08/24 1247 10/08/24 1247 10/08/24 1247 10/08/24 1741   97.6 °F (36.4 °C) (!) 119 20 137/66 99 % 7     Weight (last 2 days)       Date/Time Weight    10/08/24 1843 83 (182.98)            Vital Signs (last 3 days)       Date/Time Temp Pulse Resp BP MAP (mmHg) SpO2 O2 Device Patient Position - Orthostatic VS Pain    10/09/24 0756 -- -- -- -- -- -- None (Room air) -- 5    10/09/24 07:35:40 99 °F (37.2 °C) 88 18 105/60 75 95 % None (Room air) Sitting --    10/08/24 23:25:03 99.7 °F (37.6 °C) 85 -- 104/60 75 94 % -- -- --    10/08/24 2157 -- 95 -- -- -- 94 % -- -- --    10/08/24 2100 -- 103 -- -- -- 96 % -- -- 10 - Worst Possible Pain    10/08/24 2057 -- -- -- -- -- 97 % None (Room air) -- 10 - Worst Possible Pain    10/08/24 17:41:27 98.4 °F (36.9 °C) 85 20 121/68 86 96 % None (Room air) -- 7    10/08/24 1700 -- 86 21 119/58 -- 99 % None (Room air) -- --    10/08/24  1457 -- 99 20 120/56 81 97 % None (Room air) -- --    10/08/24 1247 97.6 °F (36.4 °C) 119 20 137/66 -- 99 % None (Room air) Sitting --              Pertinent Labs/Diagnostic Test Results:   Radiology:  CT abdomen pelvis with contrast   Final Interpretation by Milind Weiss MD (10/08 1623)         1. No acute abnormality identified in the abdomen or pelvis.   2. Large myomatous uterus.   3. Nonemergent findings as noted.               Workstation performed: [id number]                   Results from last 7 days   Lab Units 10/09/24  0446 10/08/24  1432   WBC Thousand/uL 13.34* 25.43*   HEMOGLOBIN g/dL 10.9* 13.1   HEMATOCRIT % 34.8 41.7   PLATELETS Thousands/uL 317 333   TOTAL NEUT ABS Thousands/µL 11.67*  --    BANDS PCT %  --  2         Results from last 7 days   Lab Units 10/09/24  0446 10/08/24  1432   SODIUM mmol/L 137 137   POTASSIUM mmol/L 3.6 3.6   CHLORIDE mmol/L 107 102   CO2 mmol/L 24 25   ANION GAP mmol/L 6 10   BUN mg/dL 17 13   CREATININE mg/dL 0.90 1.06   EGFR ml/min/1.73sq m 73 60   CALCIUM mg/dL 8.7 9.5     Results from last 7 days   Lab Units 10/08/24  1432   AST U/L 18   ALT U/L 21   ALK PHOS U/L 73   TOTAL PROTEIN g/dL 8.0   ALBUMIN g/dL 4.0   TOTAL BILIRUBIN mg/dL 0.55     Results from last 7 days   Lab Units 10/09/24  0621   POC GLUCOSE mg/dl 109     Results from last 7 days   Lab Units 10/09/24  0446 10/08/24  1432   GLUCOSE RANDOM mg/dL 105 92           Results from last 7 days   Lab Units 10/08/24  1432   PROTIME seconds 14.1   INR  1.01   PTT seconds 29         Results from last 7 days   Lab Units 10/08/24  1432   PROCALCITONIN ng/ml 3.84*     Results from last 7 days   Lab Units 10/08/24  1705 10/08/24  1432   LACTIC ACID mmol/L 0.8 2.1*         Results from last 7 days   Lab Units 10/08/24  1432   LIPASE u/L 26       Results from last 7 days   Lab Units 10/08/24  1515   CLARITY UA  Turbid   COLOR UA  Yellow   SPEC GRAV UA  1.026   PH UA  6.0   GLUCOSE UA mg/dl Negative   KETONES UA  mg/dl Negative   BLOOD UA  Trace*   PROTEIN UA mg/dl Trace*   NITRITE UA  Negative   BILIRUBIN UA  Negative   UROBILINOGEN UA (BE) mg/dl <2.0   LEUKOCYTES UA  Large*   WBC UA /hpf Innumerable*   RBC UA /hpf 20-30*   BACTERIA UA /hpf Occasional   EPITHELIAL CELLS WET PREP /hpf Occasional   MUCUS THREADS  Moderate*         Past Medical History:   Diagnosis Date    Crohn disease (HCC)     Dizziness     Ear problems     Nasal congestion     Otitis media      Present on Admission:   Mild intermittent asthma without complication   Crohn's disease of colon without complication (HCC)      Admitting Diagnosis: Back pain [M54.9]  Pyelonephritis [N12]  Flu-like symptoms [R68.89]  Age/Sex: 54 y.o. female    Network Utilization Review Department  ATTENTION: Please call with any questions or concerns to [phone] and carefully listen to the prompts so that you are directed to the right person. All voicemails are confidential.   For Discharge needs, contact Care Management DC Support Team at [phone] opt. [phone]  Send all requests for admission clinical reviews, approved or denied determinations and any other requests to dedicated fax number below belonging to the [facility] where the patient is receiving treatment. List of dedicated fax numbers for the Facilities:  FACILITY NAME UR FAX NUMBER   ADMISSION DENIALS (Administrative/Medical Necessity) [phone]   DISCHARGE SUPPORT TEAM (NETWORK) [phone]   PARENT CHILD HEALTH (Maternity/NICU/Pediatrics) [phone]   [facility] [phone]   [facility] [phone]   [facility] [phone]   [facility] [phone]   Critical access hospital [phone]   [facility] [phone]   [facility] [phone]   [facility] [phone]   [facility]  [place] [phone]   [facility] [phone]   [facility] [phone]   [facility] [phone]

## 2024-10-09 NOTE — PROGRESS NOTES
Progress Note - Hospitalist   Name: Mely Astorga 54 y.o. female I MRN: [id number]  Unit/Bed#: -01 I Date of Admission: 10/8/2024   Date of Service: 10/9/2024 I Hospital Day: 1    Assessment & Plan  Sepsis, unspecified organism (HCC)  Presenting on admission due to tachycardia and tachypnea.  WBC elevated 25k.  Patient complaining of urinary frequency and left lower quadrant and flank pain.  Patient reports chills and fever since Saturday  Urinalysis abnormal with concern for UTI, innumerable WBC.  However urine culture growing mixed kerri  CT abdomen and pelvis with contrast shows no hydronephrosis, perinephric stranding or renal calculi.  No other intra-abdominal pathology.  Patient has history of Crohn's but does not take any medication for the disease.  Denies previous history of UTIs.  Elevated lactic acid that improved after IV fluids and elevated procalcitonin.    Plan:  Follow-up on blood cultures, urine culture growing mixed kerri  Continue with IV antibiotics ceftriaxone 1 g every 24 hours and follow-up on blood cultures.  Follow-up on fever curve and CBC.    UTI (urinary tract infection)  Complaining of chills and subjective fever, left flank and left lower quadrant pain,  urinary frequency starting since Saturday.  Currently urine culture growing mixed kerri  Will continue with antibiotic to follow-up on blood culture.  Adjust antibiotic according to sensitivity,   see the rest of the plan for sepsis.    Mild intermittent asthma without complication  Taking as needed albuterol inhaler.    Last flare happened several years ago  Crohn's disease of colon without complication (HCC)  History of Crohn disease s/p bowel resection several years ago.  Not actively treating the disease.  Patient denies any recent flares.    VTE Pharmacologic Prophylaxis: VTE Score: 4 Moderate Risk (Score 3-4) - Pharmacological DVT Prophylaxis Ordered: enoxaparin (Lovenox).    Mobility:   Basic Mobility Inpatient  Raw Score: 24  JH-HLM Goal: 8: Walk 250 feet or more  JH-HLM Achieved: 7: Walk 25 feet or more  JH-HLM Goal NOT achieved. Continue with multidisciplinary rounding and encourage appropriate mobility to improve upon JH-HLM goals.    Patient Centered Rounds: I performed bedside rounds with nursing staff today.   Discussions with Specialists or Other Care Team Provider: none    Education and Discussions with Family / Patient: Patient declined call to .     Current Length of Stay: 1 day(s)  Current Patient Status: Inpatient   Certification Statement: The patient will continue to require additional inpatient hospital stay due to pendnig blood culture for the sepsis work up  Discharge Plan: Anticipate discharge tomorrow to home.    Code Status: Level 1 - Full Code    Subjective   Patient reports significant improvement overnight, denies fever chills.  Reports mild tenderness in the left lower quadrant.  Denies flank pain.    Objective :  Temp:  [98.4 °F (36.9 °C)-99.7 °F (37.6 °C)] 99 °F (37.2 °C)  HR:  [] 88  BP: (104-121)/(56-68) 105/60  Resp:  [18-21] 18  SpO2:  [94 %-99 %] 95 %  O2 Device: None (Room air)    Body mass index is 29.53 kg/m².     Input and Output Summary (last 24 hours):     Intake/Output Summary (Last 24 hours) at 10/9/2024 1444  Last data filed at 10/8/2024 1705  Gross per 24 hour   Intake 1050 ml   Output --   Net 1050 ml       Physical Exam  Vitals and nursing note reviewed.   Constitutional:       General: She is not in acute distress.     Appearance: She is well-developed.   HENT:      Head: Normocephalic and atraumatic.   Eyes:      Conjunctiva/sclera: Conjunctivae normal.   Cardiovascular:      Rate and Rhythm: Normal rate and regular rhythm.      Heart sounds: No murmur heard.  Pulmonary:      Effort: Pulmonary effort is normal. No respiratory distress.      Breath sounds: Normal breath sounds.   Abdominal:      Palpations: Abdomen is soft.      Tenderness: There is abdominal  tenderness (left lower quadrant). There is no right CVA tenderness or left CVA tenderness.   Musculoskeletal:         General: No swelling.      Cervical back: Neck supple.   Skin:     General: Skin is warm and dry.      Capillary Refill: Capillary refill takes less than 2 seconds.   Neurological:      Mental Status: She is alert.   Psychiatric:         Mood and Affect: Mood normal.           Lines/Drains:              Lab Results: I have reviewed the following results:   Results from last 7 days   Lab Units 10/09/24  0446 10/08/24  1432   WBC Thousand/uL 13.34* 25.43*   HEMOGLOBIN g/dL 10.9* 13.1   HEMATOCRIT % 34.8 41.7   PLATELETS Thousands/uL 317 333   BANDS PCT %  --  2   SEGS PCT % 87*  --    LYMPHO PCT % 8* 5*   MONO PCT % 4 2*   EOS PCT % 0 0     Results from last 7 days   Lab Units 10/09/24  0446 10/08/24  1432   SODIUM mmol/L 137 137   POTASSIUM mmol/L 3.6 3.6   CHLORIDE mmol/L 107 102   CO2 mmol/L 24 25   BUN mg/dL 17 13   CREATININE mg/dL 0.90 1.06   ANION GAP mmol/L 6 10   CALCIUM mg/dL 8.7 9.5   ALBUMIN g/dL  --  4.0   TOTAL BILIRUBIN mg/dL  --  0.55   ALK PHOS U/L  --  73   ALT U/L  --  21   AST U/L  --  18   GLUCOSE RANDOM mg/dL 105 92     Results from last 7 days   Lab Units 10/08/24  1432   INR  1.01     Results from last 7 days   Lab Units 10/09/24  0621   POC GLUCOSE mg/dl 109         Results from last 7 days   Lab Units 10/08/24  1705 10/08/24  1432   LACTIC ACID mmol/L 0.8 2.1*   PROCALCITONIN ng/ml  --  3.84*       Recent Cultures (last 7 days):   Results from last 7 days   Lab Units 10/08/24  1515   URINE CULTURE  >100,000 cfu/ml             Last 24 Hours Medication List:     Current Facility-Administered Medications:     acetaminophen (TYLENOL) tablet 650 mg, Q6H PRN    cefTRIAXone (ROCEPHIN) 1,000 mg in dextrose 5 % 50 mL IVPB, Q24H    enoxaparin (LOVENOX) subcutaneous injection 40 mg, Daily    oxyCODONE (ROXICODONE) IR tablet 5 mg, Q6H PRN    traMADol (ULTRAM) tablet 50 mg, Q6H  PRN    Administrative Statements   Today, Patient Was Seen By: Alea Chaney MD      **Please Note: This note may have been constructed using a voice recognition system.**

## 2024-10-09 NOTE — UTILIZATION REVIEW
NOTIFICATION OF INPATIENT ADMISSION   AUTHORIZATION REQUEST   SERVICING FACILITY:   [place] [address]  Tax ID: [id number]  NPI: [id number] ATTENDING PROVIDER:  Attending Name and NPI#: Alea Chaney Md [[id number]]  Address: [address] [place] [address]  Phone: [phone]     ADMISSION INFORMATION:  Place of Service: Inpatient [facility]  Place of Service Code: 21  Inpatient Admission Date/Time: 10/8/24  4:36 PM  Discharge Date/Time: No discharge date for patient encounter.  Admitting Diagnosis Code/Description:  Back pain [M54.9]  Pyelonephritis [N12]  Flu-like symptoms [R68.89]     UTILIZATION REVIEW CONTACT:  Arely Lopez Utilization   Network Utilization Review Department  Phone: [phone]  Fax [phone]  Email: [email or web address]  Contact for approvals/pending authorizations, clinical reviews, and discharge.     PHYSICIAN ADVISORY SERVICES:  Medical Necessity Denial & [facility] Review  Phone: [phone]  Fax: [phone]  Email: [email or web address]     DISCHARGE SUPPORT TEAM:  For Patients Discharge Needs & Updates  Phone: [phone] opt. 2 Fax: [phone]  Email: [email or web address]

## 2024-10-09 NOTE — PLAN OF CARE
Problem: GASTROINTESTINAL - ADULT  Goal: Minimal or absence of nausea and/or vomiting  Description: INTERVENTIONS:  - Administer IV fluids if ordered to ensure adequate hydration  - Maintain NPO status until nausea and vomiting are resolved  - Nasogastric tube if ordered  - Administer ordered antiemetic medications as needed  - Provide nonpharmacologic comfort measures as appropriate  - Advance diet as tolerated, if ordered  - Consider nutrition services referral to assist patient with adequate nutrition and appropriate food choices  Outcome: Progressing     Problem: MUSCULOSKELETAL - ADULT  Goal: Maintain or return mobility to safest level of function  Description: INTERVENTIONS:  - Assess patient's ability to carry out ADLs; assess patient's baseline for ADL function and identify physical deficits which impact ability to perform ADLs (bathing, care of mouth/teeth, toileting, grooming, dressing, etc.)  - Assess/evaluate cause of self-care deficits   - Assess range of motion  - Assess patient's mobility  - Assess patient's need for assistive devices and provide as appropriate  - Encourage maximum independence but intervene and supervise when necessary  - Involve family in performance of ADLs  - Assess for home care needs following discharge   - Consider OT consult to assist with ADL evaluation and planning for discharge  - Provide patient education as appropriate  Outcome: Progressing

## 2024-10-09 NOTE — ASSESSMENT & PLAN NOTE
Presenting on admission due to tachycardia and tachypnea.  WBC elevated 25k.  Patient complaining of urinary frequency and left lower quadrant and flank pain.  Patient reports chills and fever since Saturday  Urinalysis abnormal with concern for UTI, innumerable WBC.  However urine culture growing mixed kerri  CT abdomen and pelvis with contrast shows no hydronephrosis, perinephric stranding or renal calculi.  No other intra-abdominal pathology.  Patient has history of Crohn's but does not take any medication for the disease.  Denies previous history of UTIs.  Elevated lactic acid that improved after IV fluids and elevated procalcitonin.    Plan:  Follow-up on blood cultures, urine culture growing mixed kerri  Continue with IV antibiotics ceftriaxone 1 g every 24 hours and follow-up on blood cultures.  Follow-up on fever curve and CBC.

## 2024-10-09 NOTE — ASSESSMENT & PLAN NOTE
Complaining of chills and subjective fever, left flank and left lower quadrant pain,  urinary frequency starting since Saturday.  Currently urine culture growing mixed kerri  Will continue with antibiotic to follow-up on blood culture.  Adjust antibiotic according to sensitivity,   see the rest of the plan for sepsis.

## 2024-10-10 VITALS
OXYGEN SATURATION: 97 % | SYSTOLIC BLOOD PRESSURE: 117 MMHG | BODY MASS INDEX: 29.41 KG/M2 | WEIGHT: 182.98 LBS | TEMPERATURE: 98 F | HEART RATE: 63 BPM | DIASTOLIC BLOOD PRESSURE: 77 MMHG | HEIGHT: 66 IN | RESPIRATION RATE: 18 BRPM

## 2024-10-10 LAB
BASOPHILS # BLD AUTO: 0.02 THOUSANDS/ΜL (ref 0–0.1)
BASOPHILS NFR BLD AUTO: 0 % (ref 0–1)
EOSINOPHIL # BLD AUTO: 0.19 THOUSAND/ΜL (ref 0–0.61)
EOSINOPHIL NFR BLD AUTO: 3 % (ref 0–6)
ERYTHROCYTE [DISTWIDTH] IN BLOOD BY AUTOMATED COUNT: 13.3 % (ref 11.6–15.1)
GLUCOSE SERPL-MCNC: 109 MG/DL (ref 65–140)
GLUCOSE SERPL-MCNC: 90 MG/DL (ref 65–140)
HCT VFR BLD AUTO: 35.5 % (ref 34.8–46.1)
HGB BLD-MCNC: 11.1 G/DL (ref 11.5–15.4)
IMM GRANULOCYTES # BLD AUTO: 0.02 THOUSAND/UL (ref 0–0.2)
IMM GRANULOCYTES NFR BLD AUTO: 0 % (ref 0–2)
LYMPHOCYTES # BLD AUTO: 1.6 THOUSANDS/ΜL (ref 0.6–4.47)
LYMPHOCYTES NFR BLD AUTO: 24 % (ref 14–44)
MCH RBC QN AUTO: 27.5 PG (ref 26.8–34.3)
MCHC RBC AUTO-ENTMCNC: 31.3 G/DL (ref 31.4–37.4)
MCV RBC AUTO: 88 FL (ref 82–98)
MONOCYTES # BLD AUTO: 0.55 THOUSAND/ΜL (ref 0.17–1.22)
MONOCYTES NFR BLD AUTO: 8 % (ref 4–12)
NEUTROPHILS # BLD AUTO: 4.37 THOUSANDS/ΜL (ref 1.85–7.62)
NEUTS SEG NFR BLD AUTO: 65 % (ref 43–75)
NRBC BLD AUTO-RTO: 0 /100 WBCS
PLATELET # BLD AUTO: 303 THOUSANDS/UL (ref 149–390)
PMV BLD AUTO: 10.1 FL (ref 8.9–12.7)
PROCALCITONIN SERPL-MCNC: 1.94 NG/ML
RBC # BLD AUTO: 4.04 MILLION/UL (ref 3.81–5.12)
WBC # BLD AUTO: 6.75 THOUSAND/UL (ref 4.31–10.16)

## 2024-10-10 PROCEDURE — 84145 PROCALCITONIN (PCT): CPT

## 2024-10-10 PROCEDURE — 82948 REAGENT STRIP/BLOOD GLUCOSE: CPT

## 2024-10-10 PROCEDURE — 99239 HOSP IP/OBS DSCHRG MGMT >30: CPT

## 2024-10-10 PROCEDURE — 85025 COMPLETE CBC W/AUTO DIFF WBC: CPT

## 2024-10-10 RX ORDER — CEFDINIR 300 MG/1
300 CAPSULE ORAL EVERY 12 HOURS SCHEDULED
Qty: 4 CAPSULE | Refills: 0 | Status: SHIPPED | OUTPATIENT
Start: 2024-10-11 | End: 2024-10-10

## 2024-10-10 RX ORDER — CEFDINIR 300 MG/1
300 CAPSULE ORAL EVERY 12 HOURS SCHEDULED
Status: DISCONTINUED | OUTPATIENT
Start: 2024-10-10 | End: 2024-10-10 | Stop reason: HOSPADM

## 2024-10-10 RX ORDER — CEFDINIR 300 MG/1
300 CAPSULE ORAL EVERY 12 HOURS SCHEDULED
Qty: 10 CAPSULE | Refills: 0 | Status: SHIPPED | OUTPATIENT
Start: 2024-10-10 | End: 2024-10-15

## 2024-10-10 RX ORDER — CEFDINIR 300 MG/1
300 CAPSULE ORAL EVERY 12 HOURS SCHEDULED
Qty: 8 CAPSULE | Refills: 0 | Status: SHIPPED | OUTPATIENT
Start: 2024-10-11 | End: 2024-10-10

## 2024-10-10 RX ADMIN — CEFDINIR 300 MG: 300 CAPSULE ORAL at 12:27

## 2024-10-10 RX ADMIN — CEFTRIAXONE SODIUM 1000 MG: 10 INJECTION, POWDER, FOR SOLUTION INTRAVENOUS at 12:08

## 2024-10-10 NOTE — ASSESSMENT & PLAN NOTE
Presenting on admission due to tachycardia and tachypnea.  WBC elevated 25k.  Patient complaining of urinary frequency and left lower quadrant and flank pain.  Patient reports chills and fever since Saturday  Urinalysis abnormal with concern for UTI, innumerable WBC.  However urine culture growing mixed kerri  CT abdomen and pelvis with contrast shows no hydronephrosis, perinephric stranding or renal calculi.  No other intra-abdominal pathology.  Patient has history of Crohn's but does not take any medication for the disease.  Denies previous history of UTIs.  Elevated lactic acid that improved after IV fluids and elevated procalcitonin.    Plan:  Blood culture negative for 24 hours (per lab phone call update), urine culture growing mixed kerri.  Patient reports improvement in urinary symptoms such as frequency.  Will transition to oral antibiotics to complete a total of 7 day course to cover a course of pyelonephritis management.  Recommended to follow-up with the primary care doctor

## 2024-10-10 NOTE — PLAN OF CARE
Problem: GASTROINTESTINAL - ADULT  Goal: Minimal or absence of nausea and/or vomiting  Description: INTERVENTIONS:  - Administer IV fluids if ordered to ensure adequate hydration  - Maintain NPO status until nausea and vomiting are resolved  - Nasogastric tube if ordered  - Administer ordered antiemetic medications as needed  - Provide nonpharmacologic comfort measures as appropriate  - Advance diet as tolerated, if ordered  - Consider nutrition services referral to assist patient with adequate nutrition and appropriate food choices  Outcome: Progressing  Goal: Maintains adequate nutritional intake  Description: INTERVENTIONS:  - Monitor percentage of each meal consumed  - Identify factors contributing to decreased intake, treat as appropriate  - Assist with meals as needed  - Monitor I&O, weight, and lab values if indicated  - Obtain nutrition services referral as needed  Outcome: Progressing     Problem: MUSCULOSKELETAL - ADULT  Goal: Maintain or return mobility to safest level of function  Description: INTERVENTIONS:  - Assess patient's ability to carry out ADLs; assess patient's baseline for ADL function and identify physical deficits which impact ability to perform ADLs (bathing, care of mouth/teeth, toileting, grooming, dressing, etc.)  - Assess/evaluate cause of self-care deficits   - Assess range of motion  - Assess patient's mobility  - Assess patient's need for assistive devices and provide as appropriate  - Encourage maximum independence but intervene and supervise when necessary  - Involve family in performance of ADLs  - Assess for home care needs following discharge   - Consider OT consult to assist with ADL evaluation and planning for discharge  - Provide patient education as appropriate  Outcome: Progressing

## 2024-10-10 NOTE — DISCHARGE SUMMARY
Discharge Summary - Hospitalist   Name: Mely Astorga 54 y.o. female I MRN: [id number]  Unit/Bed#: -01 I Date of Admission: 10/8/2024   Date of Service: 10/10/2024 I Hospital Day: 2     Assessment & Plan  Sepsis, unspecified organism (HCC)  Presenting on admission due to tachycardia and tachypnea.  WBC elevated 25k.  Patient complaining of urinary frequency and left lower quadrant and flank pain.  Patient reports chills and fever since Saturday  Urinalysis abnormal with concern for UTI, innumerable WBC.  However urine culture growing mixed kerri  CT abdomen and pelvis with contrast shows no hydronephrosis, perinephric stranding or renal calculi.  No other intra-abdominal pathology.  Patient has history of Crohn's but does not take any medication for the disease.  Denies previous history of UTIs.  Elevated lactic acid that improved after IV fluids and elevated procalcitonin.    Plan:  Blood culture negative for 24 hours (per lab phone call update), urine culture growing mixed kerri.  Patient reports improvement in urinary symptoms such as frequency.  Will transition to oral antibiotics to complete a total of 7 day course to cover a course of pyelonephritis management.  Recommended to follow-up with the primary care doctor    UTI (urinary tract infection)  Complaining of chills and subjective fever, left flank and left lower quadrant pain, urinary frequency starting since Saturday with suspicion for pyelonephritis.  Currently urine culture growing mixed kerri however patient reports improvement in her left flank pain and urinary symptoms.  Will transition to oral antibiotics cefdinir 300 mg twice a day to complete a total of 7-day course      Mild intermittent asthma without complication  Taking as needed albuterol inhaler.    Last flare happened several years ago  Crohn's disease of colon without complication (HCC)  History of Crohn disease s/p bowel resection several years ago.  Not actively  treating the disease.  Patient denies any recent flares.     Medical Problems       Resolved Problems  Date Reviewed: 8/24/2022   None       Discharging Physician / Practitioner: Alea Chaney MD  PCP: No primary care provider on file.  Admission Date:   Admission Orders (From admission, onward)       Ordered        10/08/24 1636  Inpatient Admission  Once                          Discharge Date: 10/10/24    Consultations During Hospital Stay:  none    Procedures Performed:   none    Significant Findings / Test Results:   Results Reviewed       Procedure Component Value Units Date/Time    Blood culture #1 [[id number]] Collected: 10/08/24 1431    Lab Status: Preliminary result Specimen: Blood from Arm, Left Updated: 10/09/24 1445     Blood Culture Received in Microbiology Lab. Culture in Progress.    Blood culture #2 [[id number]] Collected: 10/08/24 1432    Lab Status: Preliminary result Specimen: Blood from Arm, Right Updated: 10/09/24 1445     Blood Culture Received in Microbiology Lab. Culture in Progress.    Urine culture [[id number]] Collected: 10/08/24 1515    Lab Status: Final result Specimen: Urine, Clean Catch Updated: 10/09/24 1314     Urine Culture >100,000 cfu/ml    Lactic acid 2 Hours [[id number]]  (Normal) Collected: 10/08/24 1705    Lab Status: Final result Specimen: Blood from Arm, Right Updated: 10/08/24 1725     LACTIC ACID 0.8 mmol/L     Narrative:      Result may be elevated if tourniquet was used during collection.    RBC Morphology Reflex Test [[id number]] Collected: 10/08/24 1432    Lab Status: Final result Specimen: Blood from Arm, Right Updated: 10/08/24 1601    CBC and differential [[id number]]  (Abnormal) Collected: 10/08/24 1432    Lab Status: Final result Specimen: Blood from Arm, Right Updated: 10/08/24 1537     WBC 25.43 Thousand/uL      RBC 4.75 Million/uL      Hemoglobin 13.1 g/dL      Hematocrit 41.7 %      MCV 88 fL      MCH 27.6 pg      MCHC 31.4 g/dL      RDW 13.2 %      MPV 10.0 fL       Platelets 333 Thousands/uL     Narrative:      This is an appended report.  These results have been appended to a previously verified report.    Manual Differential(PHLEBS Do Not Order) [[id number]]  (Abnormal) Collected: 10/08/24 1432    Lab Status: Final result Specimen: Blood from Arm, Right Updated: 10/08/24 1537     Segmented % 91 %      Bands % 2 %      Lymphocytes % 5 %      Monocytes % 2 %      Eosinophils % 0 %      Basophils % 0 %      Absolute Neutrophils 23.65 Thousand/uL      Absolute Lymphocytes 1.27 Thousand/uL      Absolute Monocytes 0.51 Thousand/uL      Absolute Eosinophils 0.00 Thousand/uL      Absolute Basophils 0.00 Thousand/uL      Total Counted --     RBC Morphology Normal     Platelet Estimate Adequate    Urine Microscopic [[id number]]  (Abnormal) Collected: 10/08/24 1515    Lab Status: Final result Specimen: Urine, Clean Catch Updated: 10/08/24 1529     RBC, UA 20-30 /hpf      WBC, UA Innumerable /hpf      Epithelial Cells Occasional /hpf      Bacteria, UA Occasional /hpf      MUCUS THREADS Moderate     Hyaline Casts, UA 3-5 /lpf     UA w Reflex to Microscopic w Reflex to Culture [[id number]]  (Abnormal) Collected: 10/08/24 1515    Lab Status: Final result Specimen: Urine, Clean Catch Updated: 10/08/24 1526     Color, UA Yellow     Clarity, UA Turbid     Specific Gravity, UA 1.026     pH, UA 6.0     Leukocytes, UA Large     Nitrite, UA Negative     Protein, UA Trace mg/dl      Glucose, UA Negative mg/dl      Ketones, UA Negative mg/dl      Urobilinogen, UA <2.0 mg/dl      Bilirubin, UA Negative     Occult Blood, UA Trace    Procalcitonin [[id number]]  (Abnormal) Collected: 10/08/24 1432    Lab Status: Final result Specimen: Blood from Arm, Right Updated: 10/08/24 1508     Procalcitonin 3.84 ng/ml     hCG, qualitative pregnancy [[id number]]  (Normal) Collected: 10/08/24 1432    Lab Status: Final result Specimen: Blood from Arm, Right Updated: 10/08/24 1507     Preg, Serum Negative    Lactic  acid [[id number]]  (Abnormal) Collected: 10/08/24 1432    Lab Status: Final result Specimen: Blood from Arm, Right Updated: 10/08/24 1503     LACTIC ACID 2.1 mmol/L     Narrative:      Result may be elevated if tourniquet was used during collection.    Comprehensive metabolic panel [[id number]] Collected: 10/08/24 1432    Lab Status: Final result Specimen: Blood from Arm, Right Updated: 10/08/24 1459     Sodium 137 mmol/L      Potassium 3.6 mmol/L      Chloride 102 mmol/L      CO2 25 mmol/L      ANION GAP 10 mmol/L      BUN 13 mg/dL      Creatinine 1.06 mg/dL      Glucose 92 mg/dL      Calcium 9.5 mg/dL      AST 18 U/L      ALT 21 U/L      Alkaline Phosphatase 73 U/L      Total Protein 8.0 g/dL      Albumin 4.0 g/dL      Total Bilirubin 0.55 mg/dL      eGFR 60 ml/min/1.73sq m     Narrative:      [employer] Kid[employer] guidelines for Chronic Kidney Disease (CKD):     Stage 1 with normal or high GFR (GFR > 90 mL/min/1.73 square meters)    Stage 2 Mild CKD (GFR = 60-89 mL/min/1.73 square meters)    Stage 3A Moderate CKD (GFR = 45-59 mL/min/1.73 square meters)    Stage 3B Moderate CKD (GFR = 30-44 mL/min/1.73 square meters)    Stage 4 Severe CKD (GFR = 15-29 mL/min/1.73 square meters)    Stage 5 End Stage CKD (GFR <15 mL/min/1.73 square meters)  Note: GFR calculation is accurate only with a steady state creatinine    Lipase [[id number]]  (Normal) Collected: 10/08/24 1432    Lab Status: Final result Specimen: Blood from Arm, Right Updated: 10/08/24 1459     Lipase 26 u/L     FLU/COVID Rapid Antigen (30 min. TAT) - Preferred screening test in ED [[id number]]  (Normal) Collected: 10/08/24 1431    Lab Status: Final result Specimen: Nares from Nose Updated: 10/08/24 1458     SARS COV Rapid Antigen Negative     Influenza A Rapid Antigen Negative     Influenza B Rapid Antigen Negative    Narrative:      This test has been performed using the Quidel Kelsey 2 FLU+SARS Antigen test under the Emergency Use Authorization  (EUA). This test has been validated by the  and verified by the performing laboratory. The Kelsey uses lateral flow immunofluorescent sandwich assay to detect SARS-COV, Influenza A and Influenza B Antigen.     The Zapointidel Kelsey 2 SARS Antigen test does not differentiate between SARS-CoV and SARS-CoV-2.     Negative results are presumptive and may be confirmed with a molecular assay, if necessary, for patient management. Negative results do not rule out SARS-CoV-2 or influenza infection and should not be used as the sole basis for treatment or patient management decisions. A negative test result may occur if the level of antigen in a sample is below the limit of detection of this test.     Positive results are indicative of the presence of viral antigens, but do not rule out bacterial infection or co-infection with other viruses.     All test results should be used as an adjunct to clinical observations and other information available to the provider.    FOR PEDIATRIC PATIENTS - copy/paste COVID Guidelines URL to browser: [email or web address]    Protime-INR [[id number]]  (Normal) Collected: 10/08/24 1432    Lab Status: Final result Specimen: Blood from Arm, Right Updated: 10/08/24 1455     Protime 14.1 seconds      INR 1.01    Narrative:      INR Therapeutic Range    Indication                                             INR Range      Atrial Fibrillation                                               2.0-3.0  Hypercoagulable State                                    2.0.2.3  Left Ventricular Asist Device                            2.0-3.0  Mechanical Heart Valve                                  -    Aortic(with afib, MI, embolism, HF, LA enlargement,    and/or coagulopathy)                                     2.0-3.0 (2.5-3.5)     Mitral                                                             2.5-3.5  Prosthetic/Bioprosthetic Heart Valve                "2.0-3.0  Venous thromboembolism (VTE: VT, PE        2.0-3.0    APTT [[id number]]  (Normal) Collected: 10/08/24 1432    Lab Status: Final result Specimen: Blood from Arm, Right Updated: 10/08/24 1455     PTT 29 seconds     POCT pregnancy, urine [[id number]]     Lab Status: No result Specimen: Urine              Incidental Findings:   none       Test Results Pending at Discharge (will require follow up):   none     Outpatient Tests Requested:  none    Complications:  none    Reason for Admission: Chills fever, left flank pain, urinary frequency    Hospital Course:   Mely Astorga is a 54 y.o. female patient who originally presented to the hospital on 10/8/2024 due to chills, fever, left flank pain concern for UTI.  On admission patient met sepsis criteria with tachycardia and tachypnea as well as elevated WBC.  CT scan did not reveal any intra-abdominal or pelvic pathology.  Patient received antibiotic IV ceftriaxone with improvement in her symptoms.  Blood culture showed no growth after 24 hours.  Urine culture showed mixed kerri.  Patient stable for discharge and she will continue a total of 7-day course of antibiotic.          Please see above list of diagnoses and related plan for additional information.     Condition at Discharge: good    Discharge Day Visit / Exam:   Subjective: Patient doing well.  Reports improvement in urinary symptoms after 2 days of antibiotics.  Denies chills or fever and significant improvement in the left flank and left lower quadrant pain.  Vitals: Blood Pressure: 117/77 (10/10/24 0726)  Pulse: 63 (10/10/24 0726)  Temperature: 98 °F (36.7 °C) (10/10/24 0726)  Temp Source: Oral (10/10/24 0726)  Respirations: 18 (10/10/24 0726)  Height: 5' 6\" (167.6 cm) (10/08/24 1843)  Weight - Scale: 83 kg (182 lb 15.7 oz) (10/08/24 1843)  SpO2: 97 % (10/10/24 0726)  Physical Exam  Vitals and nursing note reviewed.   Constitutional:       General: She is not in acute distress.     Appearance: " She is well-developed. She is not ill-appearing.   HENT:      Head: Normocephalic and atraumatic.   Eyes:      Conjunctiva/sclera: Conjunctivae normal.   Cardiovascular:      Rate and Rhythm: Normal rate and regular rhythm.      Heart sounds: No murmur heard.  Pulmonary:      Effort: Pulmonary effort is normal. No respiratory distress.      Breath sounds: Normal breath sounds. No wheezing or rales.   Abdominal:      Palpations: Abdomen is soft.      Tenderness: There is no abdominal tenderness (Left lower quadrant with improvement). There is no right CVA tenderness or left CVA tenderness.   Musculoskeletal:         General: No swelling.      Cervical back: Neck supple.   Skin:     General: Skin is warm and dry.      Capillary Refill: Capillary refill takes less than 2 seconds.   Neurological:      General: No focal deficit present.      Mental Status: She is alert and oriented to person, place, and time. Mental status is at baseline.   Psychiatric:         Mood and Affect: Mood normal.          Discussion with Family: Patient declined call to .     Discharge instructions/Information to patient and family:   See after visit summary for information provided to patient and family.      Provisions for Follow-Up Care:  See after visit summary for information related to follow-up care and any pertinent home health orders.      Mobility at time of Discharge:   Basic Mobility Inpatient Raw Score: 24  JH-HLM Goal: 8: Walk 250 feet or more  JH-HLM Achieved: 3: Sit at edge of bed  HLM Goal achieved. Continue to encourage appropriate mobility.     Disposition:   Home    Planned Readmission: no    Discharge Medications:  See after visit summary for reconciled discharge medications provided to patient and/or family.      Administrative Statements   Discharge Statement:  I have spent a total time of 45 minutes in caring for this patient on the day of the visit/encounter. >30 minutes of time was spent on: Diagnostic  results, Risks and benefits of tx options, Instructions for management, Counseling / Coordination of care, and Documenting in the medical record.    **Please Note: This note may have been constructed using a voice recognition system**

## 2024-10-10 NOTE — ASSESSMENT & PLAN NOTE
Complaining of chills and subjective fever, left flank and left lower quadrant pain, urinary frequency starting since Saturday with suspicion for pyelonephritis.  Currently urine culture growing mixed kerri however patient reports improvement in her left flank pain and urinary symptoms.  Will transition to oral antibiotics cefdinir 300 mg twice a day to complete a total of 7-day course

## 2024-10-13 LAB
BACTERIA BLD CULT: NORMAL
BACTERIA BLD CULT: NORMAL

## 2024-10-14 LAB
BACTERIA BLD CULT: NORMAL
BACTERIA BLD CULT: NORMAL